# Patient Record
Sex: MALE | Race: WHITE | NOT HISPANIC OR LATINO | Employment: OTHER | URBAN - METROPOLITAN AREA
[De-identification: names, ages, dates, MRNs, and addresses within clinical notes are randomized per-mention and may not be internally consistent; named-entity substitution may affect disease eponyms.]

---

## 2017-10-16 ENCOUNTER — OFFICE VISIT (OUTPATIENT)
Dept: FAMILY MEDICINE | Facility: CLINIC | Age: 82
End: 2017-10-16
Payer: MEDICARE

## 2017-10-16 VITALS
BODY MASS INDEX: 19.18 KG/M2 | DIASTOLIC BLOOD PRESSURE: 100 MMHG | SYSTOLIC BLOOD PRESSURE: 160 MMHG | WEIGHT: 144.69 LBS | HEART RATE: 82 BPM | RESPIRATION RATE: 16 BRPM | HEIGHT: 73 IN | OXYGEN SATURATION: 96 %

## 2017-10-16 DIAGNOSIS — I10 ESSENTIAL HYPERTENSION: ICD-10-CM

## 2017-10-16 DIAGNOSIS — E78.5 DYSLIPIDEMIA: ICD-10-CM

## 2017-10-16 DIAGNOSIS — G30.0 EARLY ONSET ALZHEIMER'S DEMENTIA WITHOUT BEHAVIORAL DISTURBANCE: ICD-10-CM

## 2017-10-16 DIAGNOSIS — F02.80 EARLY ONSET ALZHEIMER'S DEMENTIA WITHOUT BEHAVIORAL DISTURBANCE: ICD-10-CM

## 2017-10-16 DIAGNOSIS — Q60.5 CONGENITAL SMALL KIDNEY: ICD-10-CM

## 2017-10-16 DIAGNOSIS — R53.83 FATIGUE, UNSPECIFIED TYPE: ICD-10-CM

## 2017-10-16 DIAGNOSIS — J43.1 PANLOBULAR EMPHYSEMA: Primary | ICD-10-CM

## 2017-10-16 PROBLEM — J44.9 COPD (CHRONIC OBSTRUCTIVE PULMONARY DISEASE): Status: ACTIVE | Noted: 2017-10-16

## 2017-10-16 PROBLEM — H40.9 GLAUCOMA: Status: ACTIVE | Noted: 2017-10-16

## 2017-10-16 PROBLEM — R03.0 SINGLE EPISODE OF ELEVATED BLOOD PRESSURE: Status: ACTIVE | Noted: 2017-10-16

## 2017-10-16 PROCEDURE — G0009 ADMIN PNEUMOCOCCAL VACCINE: HCPCS | Mod: ,,, | Performed by: INTERNAL MEDICINE

## 2017-10-16 PROCEDURE — 90732 PPSV23 VACC 2 YRS+ SUBQ/IM: CPT | Mod: ,,, | Performed by: INTERNAL MEDICINE

## 2017-10-16 PROCEDURE — 99205 OFFICE O/P NEW HI 60 MIN: CPT | Mod: ,,, | Performed by: INTERNAL MEDICINE

## 2017-10-16 RX ORDER — LOSARTAN POTASSIUM 25 MG/1
25 TABLET ORAL DAILY
Qty: 90 TABLET | Refills: 3 | Status: SHIPPED | OUTPATIENT
Start: 2017-10-16 | End: 2017-11-16

## 2017-10-16 RX ORDER — QUETIAPINE FUMARATE 25 MG/1
125 TABLET, FILM COATED ORAL NIGHTLY
COMMUNITY
Start: 2017-10-10

## 2017-10-16 RX ORDER — CITALOPRAM 10 MG/1
10 TABLET ORAL DAILY
Qty: 30 TABLET | Refills: 11 | Status: SHIPPED | OUTPATIENT
Start: 2017-10-16 | End: 2018-09-09 | Stop reason: SDUPTHER

## 2017-10-16 RX ORDER — UMECLIDINIUM 62.5 UG/1
AEROSOL, POWDER ORAL
COMMUNITY
Start: 2017-07-14 | End: 2018-11-15

## 2017-10-16 RX ORDER — ALBUTEROL SULFATE 90 UG/1
AEROSOL, METERED RESPIRATORY (INHALATION)
COMMUNITY
Start: 2017-07-10 | End: 2019-08-06

## 2017-10-16 RX ORDER — LATANOPROST 50 UG/ML
SOLUTION/ DROPS OPHTHALMIC
COMMUNITY
Start: 2017-09-20

## 2017-10-16 RX ORDER — BUDESONIDE AND FORMOTEROL FUMARATE DIHYDRATE 160; 4.5 UG/1; UG/1
AEROSOL RESPIRATORY (INHALATION)
COMMUNITY
Start: 2017-10-13 | End: 2018-11-15

## 2017-10-16 NOTE — PATIENT INSTRUCTIONS
"  Discharge Instructions: Taking Blood Pressure Medications  You have been diagnosed with high blood pressure (hypertension). Diet and exercise help control high blood pressure. Many people also need the help of one or more medicines. Here are the main types of blood pressure medicines and how they work.  Diuretics  Diuretics are sometimes called "water pills" because they work in the kidney and flush excess water and sodium (salt) from the body. These are one of the most common and  important medicines for the management of blood pressure.  Beta-blockers  Beta-blockers reduce nerve impulses to the heart and blood vessels. This makes the heart beat slower and with less force. Your blood pressure drops, and your heart does not have to work as hard, which may improve pumping function.  ACE inhibitors  ACE inhibitors cause the vessels to relax. This helps the blood flow better and lowers blood pressure.  Angiotensin antagonists  Angiotensin antagonists shield blood vessels from a hormone that causes the blood vessels to get stiff and narrow. Your vessels become wider, and your blood pressure goes down.  Calcium channel blockers (CCBs)  CCBs keep calcium from entering the muscle cells of the heart and blood vessels. This causes blood vessels to relax, and your blood pressure goes down.  Alpha-blockers  Alpha-blockers reduce nerve impulses to blood vessels. This allows blood to pass easily, causing blood pressure to go down.  Alpha-beta blockers  Alpha-beta blockers work the same way as alpha-blockers but also slow your heartbeat. As a result, less blood is pumped through your blood vessels and your blood pressure goes down.  Vasodilators  Vasodilators directly open blood vessels by relaxing the muscle in the vessel walls, causing blood pressure to go down.  Date Last Reviewed: 4/27/2016  © 4740-3938 [employer]. [address] [place] [address], [place] [address]. All rights reserved. This information is not " intended as a substitute for professional medical care. Always follow your healthcare professional's instructions.

## 2017-11-16 ENCOUNTER — OFFICE VISIT (OUTPATIENT)
Dept: FAMILY MEDICINE | Facility: CLINIC | Age: 82
End: 2017-11-16
Payer: MEDICARE

## 2017-11-16 VITALS
OXYGEN SATURATION: 98 % | RESPIRATION RATE: 16 BRPM | HEART RATE: 78 BPM | WEIGHT: 149.19 LBS | SYSTOLIC BLOOD PRESSURE: 150 MMHG | DIASTOLIC BLOOD PRESSURE: 88 MMHG | BODY MASS INDEX: 19.77 KG/M2 | HEIGHT: 73 IN

## 2017-11-16 DIAGNOSIS — I10 ESSENTIAL HYPERTENSION: Primary | ICD-10-CM

## 2017-11-16 DIAGNOSIS — G30.0 EARLY ONSET ALZHEIMER'S DEMENTIA WITHOUT BEHAVIORAL DISTURBANCE: ICD-10-CM

## 2017-11-16 DIAGNOSIS — Q60.5 CONGENITAL SMALL KIDNEY: ICD-10-CM

## 2017-11-16 DIAGNOSIS — F02.80 EARLY ONSET ALZHEIMER'S DEMENTIA WITHOUT BEHAVIORAL DISTURBANCE: ICD-10-CM

## 2017-11-16 DIAGNOSIS — E78.5 DYSLIPIDEMIA: ICD-10-CM

## 2017-11-16 DIAGNOSIS — J43.1 PANLOBULAR EMPHYSEMA: ICD-10-CM

## 2017-11-16 PROBLEM — R03.0 SINGLE EPISODE OF ELEVATED BLOOD PRESSURE: Status: RESOLVED | Noted: 2017-10-16 | Resolved: 2017-11-16

## 2017-11-16 PROCEDURE — 99214 OFFICE O/P EST MOD 30 MIN: CPT | Mod: ,,, | Performed by: INTERNAL MEDICINE

## 2017-11-16 RX ORDER — LOSARTAN POTASSIUM 50 MG/1
50 TABLET ORAL DAILY
Qty: 90 TABLET | Refills: 3 | Status: SHIPPED | OUTPATIENT
Start: 2017-11-16 | End: 2018-11-04 | Stop reason: SDUPTHER

## 2017-11-16 NOTE — PATIENT INSTRUCTIONS
Angiotensin Receptor Blockers (ARBs)  Angiotensin receptor blockers (ARBs) are medicines. They are most often prescribed to treat high blood pressure, but can be used to treat other conditions. This sheet tells you how ARBs work and how to use them effectively.  How ARBs work     Your pharmacist can help answer any questions about your ARB medications.   ARBS help reduce blood pressure by blocking a hormone (angiotensin II) produced in the kidneys. Angiotensin II raises blood pressure by constricting arteries and causing the release of another hormone (aldosterone) that retains salt, leading to further blood pressure increase. So, when an ARB blocks angiotensin II, it results in lower blood pressure by dilating arteries and decreasing blood volume by loss of salt.  What conditions ARBs treat  · Blood pressure. Because ARBs help reduce blood pressure, they are most often used to treat high blood pressure (hypertension). They may be prescribed as an alternative to angiotensin-converting enzyme (ACE) inhibitors if certain side effects are developed on ACE inhibitors, such as cough.  · Heart failure. This is when the heart is no longer able to pump enough blood throughout the body. ARBs prevent a rise in blood pressure and lessen strain on the heart. These things help treat heart failure by making it easier for the heart to pump, and improving blood flow.  · Diabetes. This is when the body does not make enough insulin to use the sugar in the blood for energy. Diabetes can damage the blood vessels. This can lead to kidney failure (when the kidneys stop working properly). High blood pressure can also damage the blood vessels. Because ARBs help to lessen blood pressure, they help decrease the risk of blood vessel damage and kidney failure.  Side effects of ARBs  Side effects may occur during the first few days of usage, some of which fade as your body gets used to the medicine. If these side effects persist or worsen,  call your healthcare provider. Some side effects may require stopping the medicine right away, as directed by your healthcare provider. Side effects can include:  · Cough  · Low blood pressure  · Dizziness  · Drowsiness  · Diarrhea  · Stuffy nose  · Raised potassium levels  · Swelling in the deep skin layers (angiodema)  Drug interactions  Some medicines affect how other drugs work when taken together. ARBs have few interactions with other drugs. But talk to your healthcare provider if you take any of the following:  · Potassium supplements, salt substitutes, and drugs that increase potassium levels  · Diuretics (water pills)  · Lithium  · Cimetidine  · Rifampin  · Fluconazole or ketoconazole  What to tell your doctor before taking ARBs  Your doctor needs to know your health history to safely prescribe medicine for you. Be sure to tell your doctor:  · What medicines you are taking, including over-the-counter types and supplements.  · If you are allergic to any medicines, especially ACE inhibitors.  · If you have or have had other medical problems such as diabetes or heart, kidney, or liver disease.  · If you are pregnant, planning to become pregnant or are breastfeeding.  Note: Do not take ARBs when youre pregnant or thinking about becoming pregnant. This medicine can harm an unborn baby.  Tips for taking medicines  ARBs need to be taken every day--even when you feel fine. Use the tips below to stay on track.  · Have a routine. Take your medication at the same time each day. This might be with breakfast, when you brush your teeth, or before you walk the dog. If you miss a pill, dont take two the next time.  · Plan ahead. Refill prescriptions before they run out. Be sure to take enough medication with you when you travel.  · Never change your dosage or stop taking medicine on your own. This can be dangerous. Always talk to your doctor before making any changes in your medication plan.  · Use reminders. Keep  medication where you can see it. Put notes on the refrigerator or other places youll see them. Using a pillbox can also help.  · Tell your healthcare provider about other medicines or supplements you take. These can react with your medicine. Some cold and flu medicines can also raise blood pressure.  When should I call my healthcare provider  Call your healthcare provider right away if you have any of the following while taking ARBs:  · Abdominal pain  · Persistent sore throat  · Excessive fatigue  · Joint or muscle aches  · Muscle weakness  · A major change in the amount of urine produced  · Signs of allergic reaction (rash, itching, swelling, trouble breathing)   Date Last Reviewed: 6/1/2016  © 0782-0293 [employer]. [address] [place] [address], [place] [address]. All rights reserved. This information is not intended as a substitute for professional medical care. Always follow your healthcare professional's instructions.

## 2017-11-18 NOTE — PROGRESS NOTES
Subjective:       Patient ID: John Knight is a 84 y.o. male.    Chief Complaint: Follow-up (1 month f/u); COPD; Glaucoma; and Emphysema    84 year old gentleman here for follow up on his new onset hypertension. 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 He does not know the status of his cholesterol. On last ov we started cozaar at 25 mg and celexa at 10 mg which was added to his seroquel. He is doing well with this addition according to his wife. They did not get the blood work done due to a misunderstanding.       Hypertension   This is a new problem. The current episode started 1 to 4 weeks ago. The problem has been gradually improving since onset. Pertinent negatives include no chest pain, headaches, neck pain, palpitations or shortness of breath. Risk factors for coronary artery disease include dyslipidemia and male gender. Past treatments include angiotensin blockers. There are no compliance problems.  Identifiable causes of hypertension include chronic renal disease.     Review of Systems   Constitutional: Negative for activity change, diaphoresis, fatigue and fever.   HENT: Negative for congestion, ear pain, postnasal drip, sinus pressure, sore throat and trouble swallowing.    Eyes: Negative for pain.   Respiratory: Negative for cough, chest tightness, shortness of breath and wheezing.    Cardiovascular: Negative for chest pain, palpitations and leg swelling.   Gastrointestinal: Negative for abdominal distention, abdominal pain, blood in stool, constipation and diarrhea.   Endocrine: Negative for cold intolerance and heat intolerance.   Genitourinary: Negative for decreased urine volume, difficulty urinating, dysuria, flank pain, frequency and hematuria.   Musculoskeletal: Negative for arthralgias, back pain, joint swelling, myalgias and neck pain.   Skin: Negative for pallor, rash and wound.   Neurological: Negative for tremors,  syncope, weakness, light-headedness, numbness and headaches.   Hematological: Negative for adenopathy.   Psychiatric/Behavioral: Positive for decreased concentration. Negative for confusion, hallucinations, self-injury, sleep disturbance and suicidal ideas. The patient is not nervous/anxious.        Past Medical History:   Diagnosis Date    Arthritis     COPD (chronic obstructive pulmonary disease)     Emphysema of lung     Glaucoma        Past Surgical History:   Procedure Laterality Date    BRAIN SURGERY      COLON SURGERY      COLONOSCOPY      HERNIA REPAIR      TONSILLECTOMY         Family History   Problem Relation Age of Onset    Arthritis Maternal Grandmother        Social History     Social History    Marital status:      Spouse name: N/A    Number of children: N/A    Years of education: N/A     Social History Main Topics    Smoking status: Former Smoker    Smokeless tobacco: Never Used    Alcohol use 0.6 oz/week     1 Cans of beer per week      Comment: occasionally; no longer dringking beer since on Seroquel    Drug use: No    Sexual activity: Not Currently     Other Topics Concern    None     Social History Narrative    None       Current Outpatient Prescriptions   Medication Sig Dispense Refill    citalopram (CELEXA) 10 MG tablet Take 1 tablet (10 mg total) by mouth once daily. 30 tablet 11    FLUZONE HIGH-DOSE 2017-18, PF, 180 mcg/0.5 mL vaccine TO BE ADMINISTERED BY PHARMACIST FOR IMMUNIZATION  0    INCRUSE ELLIPTA 62.5 mcg/actuation DsDv       latanoprost 0.005 % ophthalmic solution       quetiapine (SEROQUEL) 25 MG Tab Take 125 mg by mouth every evening.       SYMBICORT 160-4.5 mcg/actuation HFAA       VENTOLIN HFA 90 mcg/actuation inhaler       losartan (COZAAR) 50 MG tablet Take 1 tablet (50 mg total) by mouth once daily. 90 tablet 3     No current facility-administered medications for this visit.        Review of patient's allergies indicates:  No Known  Allergies  Objective:      Physical Exam   Constitutional: He is oriented to person, place, and time. He appears well-developed and well-nourished. No distress.   HENT:   Head: Normocephalic and atraumatic.   Right Ear: External ear normal.   Left Ear: External ear normal.   Nose: Nose normal.   Mouth/Throat: Oropharynx is clear and moist.   Eyes: Conjunctivae and EOM are normal. Right eye exhibits no discharge. Left eye exhibits no discharge. No scleral icterus.   Neck: Normal range of motion. Neck supple. No JVD present. No tracheal deviation present. No thyromegaly present.   Cardiovascular: Normal rate, regular rhythm, normal heart sounds and intact distal pulses.  Exam reveals no gallop.    No murmur heard.  Pulmonary/Chest: Effort normal and breath sounds normal. No respiratory distress. He has no wheezes. He has no rales.   Abdominal: Soft. Bowel sounds are normal. He exhibits no distension and no mass. There is no tenderness.   Musculoskeletal: Normal range of motion. He exhibits no edema or tenderness.   Lymphadenopathy:     He has no cervical adenopathy.   Neurological: He is alert and oriented to person, place, and time.   Skin: Skin is warm and dry. No rash noted. He is not diaphoretic. No erythema.   Psychiatric: He has a normal mood and affect. His behavior is normal. Judgment and thought content normal.         Assessment:       1. Essential hypertension    2. Dyslipidemia    3. 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 Panlobular emphysema    5. Congenital small kidney        Plan:       Essential hypertension-increase ARB   -     losartan (COZAAR) 50 MG tablet; Take 1 tablet (50 mg total) by mouth once daily.  Dispense: 90 tablet; Refill: 3    Dyslipidemia-labs to be done this month    Early onset Alzheimer's dementia without behavioral disturbance- stable , follows with neurology    Panlobular emphysema-stable on triple mainteince inhalers    Congenital small  kidney-noted last BUN/cr - 18/1.5

## 2018-01-03 LAB
ALBUMIN SERPL-MCNC: 3.8 G/DL (ref 3.1–4.7)
ALP SERPL-CCNC: 70 IU/L (ref 40–104)
ALT (SGPT): 12 IU/L (ref 3–33)
AST SERPL-CCNC: 19 IU/L (ref 10–40)
BASOPHILS NFR BLD: 0.1 K/UL (ref 0–0.2)
BASOPHILS NFR BLD: 0.8 %
BILIRUB SERPL-MCNC: 0.8 MG/DL (ref 0.3–1)
BUN SERPL-MCNC: 23 MG/DL (ref 8–20)
CALCIUM SERPL-MCNC: 9.2 MG/DL (ref 7.7–10.4)
CHLORIDE: 105 MMOL/L (ref 98–110)
CO2 SERPL-SCNC: 29.3 MMOL/L (ref 22.8–31.6)
CREATININE: 1.63 MG/DL (ref 0.6–1.4)
EOSINOPHIL NFR BLD: 0.2 K/UL (ref 0–0.7)
EOSINOPHIL NFR BLD: 2.7 %
ERYTHROCYTE [DISTWIDTH] IN BLOOD BY AUTOMATED COUNT: 14.6 % (ref 11.7–14.9)
GLUCOSE: 104 MG/DL (ref 70–99)
GRAN #: 6.1 K/UL (ref 1.4–6.5)
GRAN%: 69.1 %
HCT VFR BLD AUTO: 40.7 % (ref 39–55)
HGB BLD-MCNC: 13.5 G/DL (ref 14–16)
IMMATURE GRANS (ABS): 0 K/UL (ref 0–1)
IMMATURE GRANULOCYTES: 0.5 %
LYMPH #: 1.6 K/UL (ref 1.2–3.4)
LYMPH%: 17.5 %
MCH RBC QN AUTO: 31.1 PG (ref 25–35)
MCHC RBC AUTO-ENTMCNC: 33.2 G/DL (ref 31–36)
MCV RBC AUTO: 93.8 FL (ref 80–100)
MONO #: 0.8 K/UL (ref 0.1–0.6)
MONO%: 9.4 %
NUCLEATED RBCS: 0 %
PLATELET # BLD AUTO: 244 K/UL (ref 140–440)
PMV BLD AUTO: 9.1 FL (ref 8.8–12.7)
POTASSIUM SERPL-SCNC: 4.4 MMOL/L (ref 3.5–5)
PROT SERPL-MCNC: 7.1 G/DL (ref 6–8.2)
RBC # BLD AUTO: 4.34 M/UL (ref 4.3–5.9)
SODIUM: 139 MMOL/L (ref 134–144)
TSH SERPL DL<=0.005 MIU/L-ACNC: 2.71 ULU/ML (ref 0.3–5.6)
WBC # BLD AUTO: 8.8 K/UL (ref 5–10)

## 2018-01-04 LAB — HOMOCYSTEINE: 13.5 UMOL/L (ref 0–15)

## 2018-01-16 ENCOUNTER — OFFICE VISIT (OUTPATIENT)
Dept: FAMILY MEDICINE | Facility: CLINIC | Age: 83
End: 2018-01-16
Payer: MEDICARE

## 2018-01-16 VITALS
DIASTOLIC BLOOD PRESSURE: 78 MMHG | BODY MASS INDEX: 19.49 KG/M2 | HEART RATE: 66 BPM | SYSTOLIC BLOOD PRESSURE: 139 MMHG | RESPIRATION RATE: 16 BRPM | HEIGHT: 72 IN | WEIGHT: 143.88 LBS | OXYGEN SATURATION: 92 %

## 2018-01-16 DIAGNOSIS — I10 ESSENTIAL HYPERTENSION: ICD-10-CM

## 2018-01-16 DIAGNOSIS — N18.30 CKD (CHRONIC KIDNEY DISEASE) STAGE 3, GFR 30-59 ML/MIN: ICD-10-CM

## 2018-01-16 DIAGNOSIS — E78.5 DYSLIPIDEMIA: ICD-10-CM

## 2018-01-16 DIAGNOSIS — E55.9 VITAMIN D DEFICIENCY: ICD-10-CM

## 2018-01-16 DIAGNOSIS — G30.0 EARLY ONSET ALZHEIMER'S DEMENTIA WITHOUT BEHAVIORAL DISTURBANCE: Primary | ICD-10-CM

## 2018-01-16 DIAGNOSIS — F02.80 EARLY ONSET ALZHEIMER'S DEMENTIA WITHOUT BEHAVIORAL DISTURBANCE: Primary | ICD-10-CM

## 2018-01-16 PROCEDURE — 99214 OFFICE O/P EST MOD 30 MIN: CPT | Mod: ,,, | Performed by: INTERNAL MEDICINE

## 2018-01-16 RX ORDER — ATORVASTATIN CALCIUM 20 MG/1
20 TABLET, FILM COATED ORAL DAILY
Qty: 90 TABLET | Refills: 3 | Status: SHIPPED | OUTPATIENT
Start: 2018-01-16 | End: 2019-01-06 | Stop reason: SDUPTHER

## 2018-01-16 NOTE — PATIENT INSTRUCTIONS

## 2018-01-17 NOTE — PROGRESS NOTES
Subjective:       Patient ID: John Knight is a 84 y.o. male.    Chief Complaint: Follow-up; Hypertension; and Labs Only    84-year-old male here for follow-up after blood work to evaluate his chronic kidney disease stage II from a congenital small kidney as well as mild anemia and Alzheimer's disease which almost appears to be more vascular-like dementia. Appointment with his neurologist next week and the patient is now up to 200 mg a seroquel evening with good control of not only sleep but also aggressive behavior. We did discuss the fact that he has not been on medications traditionally used for Alzheimer's like Aricept or Namenda and the wife says that at one time he was but it did not do a sore  To her that traditionally did not make the memory better prevent decline from worsening. His cholesterol was up and his LDL was 170 and so with the discussion of vascular dementia possibly combination with L Rodrigues dementia we will start a statin. I also recommended he go on a baby dose aspirin. Patient's BMI is still underweight      Review of Systems   Constitutional: Negative for activity change, diaphoresis, fatigue and fever.   HENT: Negative for congestion, ear pain, postnasal drip, sinus pressure, sore throat and trouble swallowing.    Eyes: Negative for pain.   Respiratory: Negative for cough, chest tightness, shortness of breath and wheezing.    Cardiovascular: Negative for chest pain, palpitations and leg swelling.   Gastrointestinal: Negative for abdominal distention, abdominal pain, blood in stool, constipation and diarrhea.   Endocrine: Negative for cold intolerance and heat intolerance.   Genitourinary: Negative for decreased urine volume, difficulty urinating, dysuria, flank pain, frequency and hematuria.   Musculoskeletal: Negative for arthralgias, back pain, joint swelling, myalgias and neck pain.   Skin: Negative for pallor, rash and wound.   Neurological: Negative for tremors, syncope, weakness,  light-headedness, numbness and headaches.   Hematological: Negative for adenopathy.   Psychiatric/Behavioral: Negative for confusion, decreased concentration, hallucinations, self-injury, sleep disturbance and suicidal ideas. The patient is not nervous/anxious.        Past Medical History:   Diagnosis Date    Arthritis     COPD (chronic obstructive pulmonary disease)     Emphysema of lung     Glaucoma        Past Surgical History:   Procedure Laterality Date    BRAIN SURGERY      COLON SURGERY      COLONOSCOPY      HERNIA REPAIR      TONSILLECTOMY         Family History   Problem Relation Age of Onset    Arthritis Maternal Grandmother        Social History     Social History    Marital status:      Spouse name: N/A    Number of children: N/A    Years of education: N/A     Social History Main Topics    Smoking status: Former Smoker    Smokeless tobacco: Never Used    Alcohol use 0.6 oz/week     1 Cans of beer per week      Comment: occasionally; no longer dringking beer since on Seroquel    Drug use: No    Sexual activity: Not Currently     Other Topics Concern    None     Social History Narrative    None       Current Outpatient Prescriptions   Medication Sig Dispense Refill    atorvastatin (LIPITOR) 20 MG tablet Take 1 tablet (20 mg total) by mouth once daily. 90 tablet 3    citalopram (CELEXA) 10 MG tablet Take 1 tablet (10 mg total) by mouth once daily. 30 tablet 11    FLUZONE HIGH-DOSE 2017-18, PF, 180 mcg/0.5 mL vaccine TO BE ADMINISTERED BY PHARMACIST FOR IMMUNIZATION  0    INCRUSE ELLIPTA 62.5 mcg/actuation DsDv       latanoprost 0.005 % ophthalmic solution       losartan (COZAAR) 50 MG tablet Take 1 tablet (50 mg total) by mouth once daily. 90 tablet 3    quetiapine (SEROQUEL) 25 MG Tab Take 125 mg by mouth every evening.       SYMBICORT 160-4.5 mcg/actuation HFAA       VENTOLIN HFA 90 mcg/actuation inhaler       zoster vaccine live, PF, (ZOSTAVAX, PF,) 19,400 unit/0.65  mL injection Inject 19,400 Units into the skin once. 1 vial 0     No current facility-administered medications for this visit.        Review of patient's allergies indicates:  No Known Allergies  Objective:      Physical Exam   Constitutional: He is oriented to person, place, and time. He appears well-developed and well-nourished. No distress.   HENT:   Head: Normocephalic and atraumatic.   Right Ear: External ear normal.   Left Ear: External ear normal.   Nose: Nose normal.   Mouth/Throat: Oropharynx is clear and moist.   Eyes: Conjunctivae and EOM are normal. Right eye exhibits no discharge. Left eye exhibits no discharge. No scleral icterus.   Neck: Normal range of motion. Neck supple. No JVD present. No tracheal deviation present. No thyromegaly present.   Cardiovascular: Normal rate, regular rhythm, normal heart sounds and intact distal pulses.  Exam reveals no gallop.    No murmur heard.  Pulmonary/Chest: Effort normal and breath sounds normal. No respiratory distress. He has no wheezes. He has no rales.   Abdominal: Soft. Bowel sounds are normal. He exhibits no distension and no mass. There is no tenderness.   Musculoskeletal: Normal range of motion. He exhibits no edema or tenderness.   Lymphadenopathy:     He has no cervical adenopathy.   Neurological: He is alert and oriented to person, place, and time.   Skin: Skin is warm and dry. No rash noted. He is not diaphoretic. No erythema.   Psychiatric: He has a normal mood and affect. His behavior is normal. Judgment and thought content normal.       Lab Results   Component Value Date    WBC 8.8 01/03/2018    HGB 13.5 (L) 01/03/2018    HCT 40.7 01/03/2018     01/03/2018    CHOL 167 05/13/2010    TRIG 100 05/13/2010    HDL 58 05/13/2010    ALT 13 05/13/2010    AST 19 01/03/2018     01/03/2018    K 4.4 01/03/2018     01/03/2018    CREATININE 1.63 (H) 01/03/2018    BUN 23 (H) 01/03/2018    CO2 29.3 01/03/2018    TSH 2.71 01/03/2018      Assessment:       1. 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 Dyslipidemia    3. Essential hypertension    4. CKD (chronic kidney disease) stage 3, GFR 30-59 ml/min    5. 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  Dispense: 90 tablet; Refill: 3  -     Lipid panel; Future; Expected date: 01/16/2018  -     Comprehensive metabolic panel; Future; Expected date: 01/16/2018    Essential hypertension-Much better control with an increase of the ARB, Cozaar to 50 mg which is an entirely new medication for him over the past few months and unfortunately there is a mild decline in his kidney function but will leave that to his nephrologist    CKD (chronic kidney disease) stage 3, GFR 30-59 ml/min  -     Comprehensive metabolic panel; Future; Expected date: 01/16/2018  -     Microalbumin/creatinine urine ratio; Future; Expected date: 01/16/2018    Vitamin D deficiency  -     Vitamin D; Future; Expected date: 01/17/2018    Other orders  -     zoster vaccine live, PF, (ZOSTAVAX, PF,) 19,400 unit/0.65 mL injection; Inject 19,400 Units into the skin once.  Dispense: 1 vial; Refill: 0    Time spent with the patient was 40 min and 50 percent of that was in face to face contact

## 2018-01-18 ENCOUNTER — TELEPHONE (OUTPATIENT)
Dept: FAMILY MEDICINE | Facility: CLINIC | Age: 83
End: 2018-01-18

## 2018-01-18 NOTE — TELEPHONE ENCOUNTER
----- Message from Idania Connors MD sent at 1/8/2018  6:13 AM CST -----  Keep ov next week to discuss lab work abnormalities - chol, urine, kidney etc

## 2018-05-14 LAB
ALBUMIN SERPL-MCNC: 3.5 G/DL (ref 3.1–4.7)
ALP SERPL-CCNC: 82 IU/L (ref 40–104)
ALT (SGPT): 11 IU/L (ref 3–33)
AST SERPL-CCNC: 19 IU/L (ref 10–40)
BILIRUB SERPL-MCNC: 0.3 MG/DL (ref 0.3–1)
BUN SERPL-MCNC: 21 MG/DL (ref 8–20)
CALCIUM SERPL-MCNC: 8.9 MG/DL (ref 7.7–10.4)
CHLORIDE: 109 MMOL/L (ref 98–110)
CO2 SERPL-SCNC: 28.5 MMOL/L (ref 22.8–31.6)
CREATININE RANDOM URINE: 77 MG/DL
CREATININE: 1.43 MG/DL (ref 0.6–1.4)
GLUCOSE: 94 MG/DL (ref 70–99)
MICROALBUM.,U,RANDOM: 43.1 MCG/ML (ref 0–19.9)
MICROALBUMIN/CREATININE RATIO: 56 (ref 0–30)
POTASSIUM SERPL-SCNC: 4.3 MMOL/L (ref 3.5–5)
PROT SERPL-MCNC: 6.9 G/DL (ref 6–8.2)
SODIUM: 143 MMOL/L (ref 134–144)
VITAMIN D, 1,25 (OH)2: 41.5 NG/ML (ref 30–100)

## 2018-05-15 ENCOUNTER — OFFICE VISIT (OUTPATIENT)
Dept: FAMILY MEDICINE | Facility: CLINIC | Age: 83
End: 2018-05-15
Payer: MEDICARE

## 2018-05-15 VITALS
RESPIRATION RATE: 16 BRPM | BODY MASS INDEX: 18.78 KG/M2 | SYSTOLIC BLOOD PRESSURE: 130 MMHG | DIASTOLIC BLOOD PRESSURE: 75 MMHG | HEART RATE: 66 BPM | WEIGHT: 138.63 LBS | OXYGEN SATURATION: 98 % | HEIGHT: 72 IN

## 2018-05-15 DIAGNOSIS — R63.6 UNDERWEIGHT: ICD-10-CM

## 2018-05-15 DIAGNOSIS — E78.5 DYSLIPIDEMIA: ICD-10-CM

## 2018-05-15 DIAGNOSIS — Q60.5 CONGENITAL SMALL KIDNEY: ICD-10-CM

## 2018-05-15 DIAGNOSIS — I10 ESSENTIAL HYPERTENSION: Primary | ICD-10-CM

## 2018-05-15 DIAGNOSIS — N18.30 CKD (CHRONIC KIDNEY DISEASE) STAGE 3, GFR 30-59 ML/MIN: ICD-10-CM

## 2018-05-15 PROCEDURE — 99214 OFFICE O/P EST MOD 30 MIN: CPT | Mod: ,,, | Performed by: INTERNAL MEDICINE

## 2018-05-15 NOTE — PATIENT INSTRUCTIONS
For Caregivers: Improving Communication with Dementia Patients  Dementia makes it harder for your loved one to understand and be understood. This can be troubling for both of you. Remember, these problems are not your loved ones fault. Theyre due to the disease. These tips can help you find ways to cope.    Keep it simple  The best way to talk to your loved one is using simple words and short sentences. Be sure you have his or her attention. Stick to one subject at a time. And use a gentle, positive tone of voice. Timing is also important. Giving information for activities that are hours away may be a waste of time and energy.  Instead of saying: Its cold outside, so be sure to put on your coat and hat.  Try: Please put on your coat. Now put on your hat.   Be patient  People with dementia often lose their short-term memory. This means they may ask the same question over and over. Although it can be frustrating, do your best to remain calm. Try changing the subject or getting your loved one to focus on a new task. Or, try to understand why the question is being asked. For example, your loved one may worry about missing an appointment or being left behind.  Instead of saying: Sharmaine already told you--the appointment is at 2 oclock!  Try: Dont worry. Im going with you.   Use distraction  Your loved one may try to do things that are inappropriate or unsafe. At these times, a good tactic is using distraction. Try getting your loved one to focus on something new. Your loved one may then forget what he or she was doing in the first place.  Instead of saying: What are you doing? You cant go out now!  Try: Would you help me with dinner?   Try statements, not questions  Your loved one may not want to do certain activities. Instead of arguing, phrase requests as statements rather than questions. Using visual cues, such as holding a towel when its time for a bath, can also help.  Instead of saying: Do you  want to take your bath now?  Try: Its time for your bath. Let me help you get ready.   Avoid arguing about reality  People with dementia may not be able to separate past from present. If this happens, dont insist on your version of reality. It may be best to change the subject or play along to avoid added stress. Refrain from using harsh words or angry gestures. Your loved one may not understand you. But he or she can still be upset by your emotional cues. And if your loved one becomes very upset, stay calm. Try to redirect his or her attention to another activity.  Instead of saying: You cant call your father. Hes been dead for years!  Try: Lets look at some pictures of him.   Coping with changes in behavior and personality  People with dementia may have moments when they seem perfectly normal. At other times, they may act suspicious, angry, or afraid. They can also become agitated, or see things that arent there. If this happens, try to be understanding. For them, the world can be a very stressful place. However, if these changes are sudden, severe, or create safety issues, talk to a doctor. You should also mention any signs of depression. These include withdrawal, loss of interest in activities or food, extreme tiredness, and crying.  Date Last Reviewed: 7/1/2016  © 5285-2983 [employer]. [address] [place] [address], [place] [address]. All rights reserved. This information is not intended as a substitute for professional medical care. Always follow your healthcare professional's instructions.

## 2018-05-15 NOTE — PROGRESS NOTES
Subjective:       Patient ID: John Knight is a 84 y.o. male.    Chief Complaint: Follow-up (3 month f/u) and Hyperlipidemia    84-year-old gentleman who is here for follow-up after lab work and for his chronic medical illnesses most importantly to include dyslipidemia very recently started him on atorvastatin due to his worsening dementia. He also has a history of hypertension and COPD which is been very stable for quite some time. The patient is seen neurology and now is up to 100 mg of her call in the evening. His dementia must be small vessel disease as he is not on Aricept or Namenda but he is on Celexa 10 mg. His blood pressure stable and he denies any symptoms of headaches or shortness of breath. His lab work was reviewed and all was within normal limits except his BUN/creatinine which revealed stage III chronic kidney disease which was found 6 months prior. He has seen the kidney doctor one time and his microalbumin to creatinine ratio slightly elevated as well. 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 His lipid profiles under excellent control on the atorvastatin. It has been very difficult for the patient to keep on weight as he has no appetite. At present his BMI is 18.8. He has lost 5 pounds since his last office visit 4 months ago.       Review of Systems   Constitutional: Positive for unexpected weight change. Negative for activity change, diaphoresis, fatigue and fever.   HENT: Negative for congestion, ear pain, postnasal drip, sinus pressure, sore throat and trouble swallowing.    Eyes: Negative for pain.   Respiratory: Negative for cough, chest tightness, shortness of breath and wheezing.    Cardiovascular: Negative for chest pain, palpitations and leg swelling.   Gastrointestinal: Negative for abdominal distention, abdominal pain, blood in stool, constipation and diarrhea.   Endocrine: Negative for cold intolerance and heat intolerance.   Genitourinary:  Negative for decreased urine volume, difficulty urinating, dysuria, flank pain, frequency and hematuria.   Musculoskeletal: Negative for arthralgias, back pain, joint swelling, myalgias and neck pain.   Skin: Negative for pallor, rash and wound.   Neurological: Negative for tremors, syncope, weakness, light-headedness, numbness and headaches.   Hematological: Negative for adenopathy.   Psychiatric/Behavioral: Negative for confusion, decreased concentration, hallucinations, self-injury, sleep disturbance and suicidal ideas. The patient is not nervous/anxious.        Past Medical History:   Diagnosis Date    Arthritis     COPD (chronic obstructive pulmonary disease)     Emphysema of lung     Glaucoma        Past Surgical History:   Procedure Laterality Date    BRAIN SURGERY      COLON SURGERY      COLONOSCOPY      HERNIA REPAIR      TONSILLECTOMY         Family History   Problem Relation Age of Onset    Arthritis Maternal Grandmother        Social History     Social History    Marital status:      Spouse name: N/A    Number of children: N/A    Years of education: N/A     Social History Main Topics    Smoking status: Former Smoker    Smokeless tobacco: Never Used    Alcohol use 0.6 oz/week     1 Cans of beer per week      Comment: occasionally; no longer dringking beer since on Seroquel    Drug use: No    Sexual activity: Not Currently     Other Topics Concern    None     Social History Narrative    None       Current Outpatient Prescriptions   Medication Sig Dispense Refill    atorvastatin (LIPITOR) 20 MG tablet Take 1 tablet (20 mg total) by mouth once daily. 90 tablet 3    citalopram (CELEXA) 10 MG tablet Take 1 tablet (10 mg total) by mouth once daily. 30 tablet 11    FLUZONE HIGH-DOSE 2017-18, PF, 180 mcg/0.5 mL vaccine TO BE ADMINISTERED BY PHARMACIST FOR IMMUNIZATION  0    INCRUSE ELLIPTA 62.5 mcg/actuation DsDv       latanoprost 0.005 % ophthalmic solution       losartan  (COZAAR) 50 MG tablet Take 1 tablet (50 mg total) by mouth once daily. 90 tablet 3    quetiapine (SEROQUEL) 25 MG Tab Take 125 mg by mouth every evening.       SYMBICORT 160-4.5 mcg/actuation HFAA       VENTOLIN HFA 90 mcg/actuation inhaler       arginine-glutamine-calcium HMB (FIONA) 7-7-1.5 gram PwPk Take 1 packet by mouth 2 (two) times daily. 60 each 11     No current facility-administered medications for this visit.        Review of patient's allergies indicates:  No Known Allergies  Objective:      Physical Exam   Constitutional: He is oriented to person, place, and time. He appears well-developed and well-nourished. No distress.   HENT:   Head: Normocephalic and atraumatic.   Right Ear: External ear normal.   Left Ear: External ear normal.   Nose: Nose normal.   Mouth/Throat: Oropharynx is clear and moist.   Eyes: Conjunctivae and EOM are normal. Right eye exhibits no discharge. Left eye exhibits no discharge. No scleral icterus.   Neck: Normal range of motion. Neck supple. No JVD present. No tracheal deviation present. No thyromegaly present.   Cardiovascular: Normal rate, regular rhythm, normal heart sounds and intact distal pulses.  Exam reveals no gallop.    No murmur heard.  Pulmonary/Chest: Effort normal and breath sounds normal. No respiratory distress. He has no wheezes. He has no rales.   Abdominal: Soft. Bowel sounds are normal. He exhibits no distension and no mass. There is no tenderness.   Musculoskeletal: Normal range of motion. He exhibits no edema or tenderness.   Lymphadenopathy:     He has no cervical adenopathy.   Neurological: He is alert and oriented to person, place, and time.   Skin: Skin is warm and dry. No rash noted. He is not diaphoretic. No erythema.   Psychiatric: He has a normal mood and affect. His behavior is normal. Judgment and thought content normal.       Lab Results   Component Value Date    WBC 8.8 01/03/2018    HGB 13.5 (L) 01/03/2018    HCT 40.7 01/03/2018      01/03/2018    CHOL 167 05/13/2010    TRIG 100 05/13/2010    HDL 58 05/13/2010    ALT 13 05/13/2010    AST 19 05/14/2018     05/14/2018    K 4.3 05/14/2018     05/14/2018    CREATININE 1.43 (H) 05/14/2018    BUN 21 (H) 05/14/2018    CO2 28.5 05/14/2018    TSH 2.71 01/03/2018     Assessment:       1. Essential hypertension    2. Dyslipidemia    3. Congenital small kidney    4. CKD (chronic kidney disease) stage 3, GFR 30-59 ml/min    5. Underweight        Plan:       Essential hypertension-Now controlled on high-dose of arm    Dyslipidemia-first lipid profile after starting atorvastatin great.    Congenital small kidney  Accounting for  CKD (chronic kidney disease) stage 3, GFR 30-59 ml/min-reiterated no nonsteroidals    Underweight  -     arginine-glutamine-calcium HMB (FIONA) 7-7-1.5 gram PwPk; Take 1 packet by mouth 2 (two) times daily.  Dispense: 60 each; Refill: 11

## 2018-08-28 RX ORDER — MEGESTROL ACETATE 40 MG/ML
400 SUSPENSION ORAL DAILY
Qty: 300 ML | Refills: 11 | Status: SHIPPED | OUTPATIENT
Start: 2018-08-28 | End: 2019-05-07 | Stop reason: SDUPTHER

## 2018-09-09 DIAGNOSIS — F02.80 EARLY ONSET ALZHEIMER'S DEMENTIA WITHOUT BEHAVIORAL DISTURBANCE: ICD-10-CM

## 2018-09-09 DIAGNOSIS — G30.0 EARLY ONSET ALZHEIMER'S DEMENTIA WITHOUT BEHAVIORAL DISTURBANCE: ICD-10-CM

## 2018-09-10 RX ORDER — CITALOPRAM 10 MG/1
10 TABLET ORAL DAILY
Qty: 30 TABLET | Refills: 11 | Status: SHIPPED | OUTPATIENT
Start: 2018-09-10 | End: 2019-09-10

## 2018-10-11 DIAGNOSIS — I10 ESSENTIAL HYPERTENSION: ICD-10-CM

## 2018-10-11 RX ORDER — LOSARTAN POTASSIUM 25 MG/1
25 TABLET ORAL DAILY
Qty: 90 TABLET | Refills: 3 | Status: SHIPPED | OUTPATIENT
Start: 2018-10-11 | End: 2018-11-05

## 2018-11-04 DIAGNOSIS — I10 ESSENTIAL HYPERTENSION: ICD-10-CM

## 2018-11-05 RX ORDER — LOSARTAN POTASSIUM 50 MG/1
50 TABLET ORAL DAILY
Qty: 90 TABLET | Refills: 1 | Status: SHIPPED | OUTPATIENT
Start: 2018-11-05 | End: 2019-05-09 | Stop reason: SDUPTHER

## 2018-11-15 ENCOUNTER — OFFICE VISIT (OUTPATIENT)
Dept: FAMILY MEDICINE | Facility: CLINIC | Age: 83
End: 2018-11-15
Payer: MEDICARE

## 2018-11-15 VITALS
OXYGEN SATURATION: 96 % | HEIGHT: 72 IN | BODY MASS INDEX: 17.41 KG/M2 | RESPIRATION RATE: 16 BRPM | DIASTOLIC BLOOD PRESSURE: 68 MMHG | SYSTOLIC BLOOD PRESSURE: 112 MMHG | WEIGHT: 128.5 LBS | HEART RATE: 50 BPM

## 2018-11-15 DIAGNOSIS — N18.30 CKD (CHRONIC KIDNEY DISEASE) STAGE 3, GFR 30-59 ML/MIN: ICD-10-CM

## 2018-11-15 DIAGNOSIS — G30.0 EARLY ONSET ALZHEIMER'S DEMENTIA WITHOUT BEHAVIORAL DISTURBANCE: ICD-10-CM

## 2018-11-15 DIAGNOSIS — R63.4 ABNORMAL WEIGHT LOSS: ICD-10-CM

## 2018-11-15 DIAGNOSIS — I10 ESSENTIAL HYPERTENSION: ICD-10-CM

## 2018-11-15 DIAGNOSIS — R63.0 ANOREXIA: ICD-10-CM

## 2018-11-15 DIAGNOSIS — F02.80 EARLY ONSET ALZHEIMER'S DEMENTIA WITHOUT BEHAVIORAL DISTURBANCE: ICD-10-CM

## 2018-11-15 DIAGNOSIS — Z85.51 HISTORY OF CARCINOMA OF BLADDER: ICD-10-CM

## 2018-11-15 DIAGNOSIS — Z98.890 HISTORY OF URETEROSTOMY: ICD-10-CM

## 2018-11-15 DIAGNOSIS — N39.0 URINARY TRACT INFECTION ASSOCIATED WITH NEPHROSTOMY CATHETER, INITIAL ENCOUNTER: Primary | ICD-10-CM

## 2018-11-15 DIAGNOSIS — T83.512A URINARY TRACT INFECTION ASSOCIATED WITH NEPHROSTOMY CATHETER, INITIAL ENCOUNTER: Primary | ICD-10-CM

## 2018-11-15 PROCEDURE — 99214 OFFICE O/P EST MOD 30 MIN: CPT | Mod: ,,, | Performed by: INTERNAL MEDICINE

## 2018-11-15 RX ORDER — MIRTAZAPINE 7.5 MG/1
7.5 TABLET, FILM COATED ORAL NIGHTLY
Qty: 30 TABLET | Refills: 11 | Status: SHIPPED | OUTPATIENT
Start: 2018-11-15 | End: 2019-05-15

## 2018-11-15 RX ORDER — CIPROFLOXACIN 500 MG/1
500 TABLET ORAL 2 TIMES DAILY
Qty: 14 TABLET | Refills: 0 | Status: SHIPPED | OUTPATIENT
Start: 2018-11-15 | End: 2019-03-23 | Stop reason: SDUPTHER

## 2018-11-15 NOTE — PATIENT INSTRUCTIONS
When Your Child Has a Urinary Tract Infection (UTI)   A urinary tract infection (UTI) is a bacterial infection in the urinary tract. The urinary tract is made up of the kidneys, ureters, bladder, and urethra. Children often get UTIs that affect the bladder. UTIs can be uncomfortable and painful. But with treatment, most children recover with no lasting effects.  What is the urinary tract?  The following body parts make up the urinary tract:     A urinary tract infection is caused by bacteria that enter the urinary tract.    · Kidneys filter waste from the blood and make urine.  · Ureters carry urine from the kidneys to the bladder.  · The bladder stores urine.  · The urethra carries urine from the bladder to the outside of the body.  What causes a urinary tract infection?  Most UTIs are caused by bacteria that enter the urinary tract through the urethra. The urinary tracts of boys and girls are slightly different. The urethra is shorter in girls. This makes it easier for bacteria to enter. As a result, girls are more likely than boys to get UTIs.  What are the symptoms of a urinary tract infection?  · If your child has a UTI affecting the bladder (cystitis), symptoms can include:  ¨ Painful urination  ¨ Frequent urination  ¨ Urgent need to urinate  ¨ Blood in the urine  ¨ Daytime wetting or nighttime bedwetting when previously continent  · If your child has a UTI affecting the kidneys (pyelonephritis), symptoms are similar to those of a bladder infection. They can also include:  ¨ Fever  ¨ Abdominal pain  ¨ Nausea and vomiting  ¨ Cloudy urine  ¨ Foul-smelling urine  How is a urinary tract infection diagnosed?  · The doctor asks about your childs symptoms and health history. Your child is examined.  · A lab test, such as a urinalysis, is done. For this test, a urine sample is needed to check for bacteria and other signs of infection. The urine is also sent for a culture, a test that identifies what bacteria is  growing in the urine. It can take 1 to 3 days to get results of a urine culture. If a UTI is suspected, the doctor will likely start treatment even before lab results come back.  · If your child has severe symptoms, other tests may be done. Youll be told more about this, if needed.  How is a urinary tract infection treated?  · Symptoms of a UTI generally go away within 24 to 72 hours of starting treatment.  · The doctor will prescribe antibiotics for your child. Make sure your child takes ALL of the medication even if he or she starts feeling better.   · You can do the following at home to relieve your childs symptoms:  ¨ Give your child over-the-counter (OTC) medications, such as ibuprofen or acetaminophen, to manage pain and fever. Do not give ibuprofen to an infant who is less than 6 months of age, or to a child who is dehydrated or constantly vomiting. Do not give aspirin to a child with a fever. This can put your child at risk of a serious illness called Reyes syndrome.  ¨ Ask your doctor about other medications that can be prescribed to relieve painful urination.  ¨ Give your child plenty of fluids to drink. Cranberry juice may help relieve some pain symptoms.  When you should call your healthcare provider  Call the doctor if your child has any of the following:  · Symptoms that do not improve within 48 hours of starting treatment  · Fever (see Fever and children, below)  · A fever that goes away but returns after starting treatment  · Increased abdominal or back pain  · Signs of dehydration (very dark or little urine, excessive thirst, dry mouth, dizziness)  · Vomiting or inability to tolerate prescribed antibiotics  · Child begins acting sicker  · If a urine culture was done, make sure to get the results from the healthcare provider. Make an appointment to follow up about a week after your child has finished antibiotics.  Fever and children  Always use a digital thermometer to check your childs  temperature. Never use a mercury thermometer.  For infants and toddlers, be sure to use a rectal thermometer correctly. A rectal thermometer may accidentally poke a hole in (perforate) the rectum. It may also pass on germs from the stool. Always follow the product makers directions for proper use. If you dont feel comfortable taking a rectal temperature, use another method. When you talk to your childs healthcare provider, tell him or her which method you used to take your childs temperature.  Here are guidelines for fever temperature. Ear temperatures arent accurate before 6 months of age. Dont take an oral temperature until your child is at least 4 years old.  Infant under 3 months old:  · Ask your childs healthcare provider how you should take the temperature.  · Rectal or forehead (temporal artery) temperature of 100.4°F (38°C) or higher, or as directed by the provider  · Armpit temperature of 99°F (37.2°C) or higher, or as directed by the provider  Child age 3 to 36 months:  · Rectal, forehead (temporal artery), or ear temperature of 102°F (38.9°C) or higher, or as directed by the provider  · Armpit temperature of 101°F (38.3°C) or higher, or as directed by the provider  Child of any age:  · Repeated temperature of 104°F (40°C) or higher, or as directed by the provider  · Fever that lasts more than 24 hours in a child under 2 years old. Or a fever that lasts for 3 days in a child 2 years or older.      How is a urinary tract infection prevented?  · Encourage your child to drink plenty of fluids.  · Encourage your child to empty the bladder all the way when urinating.  · Teach girls to wipe from the front to back when using the bathroom.  · Don't use bubble bath.  · Don't allow your child to become constipated.  · If your child has a UTI, he or she may need ultrasound imaging of the kidneys and bladder. This helps the doctor rule out possible anatomical problems that could cause a UTI. If problems are  found, or if your child has recurrent UTIs, additional imaging tests may be helpful.  Date Last Reviewed: 1/1/2017  © 3537-0643 [employer]. [address] [place] [address], [place] [address]. All rights reserved. This information is not intended as a substitute for professional medical care. Always follow your healthcare professional's instructions.

## 2018-11-15 NOTE — PROGRESS NOTES
Subjective:       Patient ID: John Knight is a 85 y.o. male.    Chief Complaint: Follow-up (6 month f/u); Hypertension; Shortness of Breath; and COPD    85-year-old gentleman here for six-month follow-up for hypertension, COPD he also has chronic kidney disease. He has fairly progressive Alzheimer's disease for which over the past year his appetite has gone down significantly. He was never a big eater. He is 20 pounds lighter at least that he was the year prior. His wife is with him and has become difficult to bath, eat and sometimes dress himself. He has a urostomy tube for bladder cancer back in 2000 and had no problems with it but he has been forgetting even when reminded to put on the ostomy bag. Today he came with no back and upon confrontation in the examining room there was an odor to his self that was consistent with a urinary tract infection. He has no fevers or chills. In trying to look at the urine it was a scant amount in the ostomy site without the bad that did look a little cloudy. He's also has COPD he had a little increased shortness of breath with the weather change over the past couple of days. He is maintained now on Trelegy. He is on Seroquel at night 125 mg and Celexa 10 mg in the morning. He's been on Megace for a few months now with a subtle change in his appetite but no help with weight gain.      Review of Systems   Constitutional: Positive for appetite change, fatigue and unexpected weight change. Negative for activity change, diaphoresis and fever.   HENT: Negative for congestion, ear pain, postnasal drip, sinus pressure, sore throat and trouble swallowing.    Eyes: Negative for pain.   Respiratory: Positive for shortness of breath. Negative for cough, chest tightness and wheezing.    Cardiovascular: Negative for chest pain, palpitations and leg swelling.   Gastrointestinal: Negative for abdominal distention, abdominal pain, blood in stool, constipation and diarrhea.   Endocrine:  Negative for cold intolerance and heat intolerance.   Genitourinary: Negative for decreased urine volume, difficulty urinating, dysuria, flank pain, frequency and hematuria.   Musculoskeletal: Negative for arthralgias, back pain, joint swelling, myalgias and neck pain.   Skin: Negative for pallor, rash and wound.   Neurological: Negative for tremors, syncope, weakness, light-headedness, numbness and headaches.   Hematological: Negative for adenopathy.   Psychiatric/Behavioral: Negative for confusion, decreased concentration, hallucinations, self-injury, sleep disturbance and suicidal ideas. The patient is not nervous/anxious.        Past Medical History:   Diagnosis Date    Arthritis     COPD (chronic obstructive pulmonary disease)     Emphysema of lung     Glaucoma        Past Surgical History:   Procedure Laterality Date    BRAIN SURGERY      COLON SURGERY      COLONOSCOPY      HERNIA REPAIR      TONSILLECTOMY         Family History   Problem Relation Age of Onset    Arthritis Maternal Grandmother        Social History     Socioeconomic History    Marital status:      Spouse name: None    Number of children: None    Years of education: None    Highest education level: None   Social Needs    Financial resource strain: None    Food insecurity - worry: None    Food insecurity - inability: None    Transportation needs - medical: None    Transportation needs - non-medical: None   Occupational History    None   Tobacco Use    Smoking status: Former Smoker    Smokeless tobacco: Never Used   Substance and Sexual Activity    Alcohol use: Yes     Alcohol/week: 0.6 oz     Types: 1 Cans of beer per week     Comment: occasionally; no longer dringking beer since on Seroquel    Drug use: No    Sexual activity: Not Currently   Other Topics Concern    None   Social History Narrative    None       Current Outpatient Medications   Medication Sig Dispense Refill    arginine-glutamine-calcium HMB  (FIONA) 7-7-1.5 gram PwPk Take 1 packet by mouth 2 (two) times daily. 60 each 11    atorvastatin (LIPITOR) 20 MG tablet Take 1 tablet (20 mg total) by mouth once daily. 90 tablet 3    citalopram (CELEXA) 10 MG tablet TAKE 1 TABLET (10 MG TOTAL) BY MOUTH ONCE DAILY. 30 tablet 11    fluticasone-umeclidin-vilanter (TRELEGY ELLIPTA) 100-62.5-25 mcg DsDv Inhale 1 Inhaler into the lungs once daily.      latanoprost 0.005 % ophthalmic solution       losartan (COZAAR) 50 MG tablet TAKE 1 TABLET (50 MG TOTAL) BY MOUTH ONCE DAILY. 90 tablet 1    megestrol (MEGACE) 400 mg/10 mL (40 mg/mL) Susp Take 10 mLs (400 mg total) by mouth once daily. 300 mL 11    quetiapine (SEROQUEL) 25 MG Tab Take 125 mg by mouth every evening.       VENTOLIN HFA 90 mcg/actuation inhaler       ciprofloxacin HCl (CIPRO) 500 MG tablet Take 1 tablet (500 mg total) by mouth 2 (two) times daily. 14 tablet 0    FLUZONE HIGH-DOSE 2017-18, PF, 180 mcg/0.5 mL vaccine TO BE ADMINISTERED BY PHARMACIST FOR IMMUNIZATION  0    mirtazapine (REMERON) 7.5 MG Tab Take 1 tablet (7.5 mg total) by mouth every evening. 30 tablet 11     No current facility-administered medications for this visit.        Review of patient's allergies indicates:  No Known Allergies  Objective:      Physical Exam   Constitutional: He is oriented to person, place, and time. No distress.   Disheveled and malodorous -    HENT:   Head: Normocephalic and atraumatic.   Right Ear: External ear normal.   Left Ear: External ear normal.   Nose: Nose normal.   Mouth/Throat: Oropharynx is clear and moist.   Eyes: Conjunctivae and EOM are normal. Right eye exhibits no discharge. Left eye exhibits no discharge. No scleral icterus.   Neck: Normal range of motion. Neck supple. No JVD present. No tracheal deviation present. No thyromegaly present.   Cardiovascular: Normal rate, regular rhythm, normal heart sounds and intact distal pulses. Exam reveals no gallop.   No murmur heard.  Pulmonary/Chest:  Effort normal. No respiratory distress. He has decreased breath sounds. He has no wheezes. He has no rales.   Abdominal: Soft. Bowel sounds are normal. He exhibits no distension and no mass. There is no tenderness.   Musculoskeletal: Normal range of motion. He exhibits no edema or tenderness.   Lymphadenopathy:     He has no cervical adenopathy.   Neurological: He is alert and oriented to person, place, and time.   Skin: Skin is warm, dry and intact. No rash noted. He is not diaphoretic. No erythema.   Psychiatric: He has a normal mood and affect. His behavior is normal. Judgment and thought content normal.       Lab Results   Component Value Date    WBC 8.8 01/03/2018    HGB 13.5 (L) 01/03/2018    HCT 40.7 01/03/2018     01/03/2018    CHOL 167 05/13/2010    TRIG 100 05/13/2010    HDL 58 05/13/2010    ALT 13 05/13/2010    AST 19 05/14/2018     05/14/2018    K 4.3 05/14/2018     05/14/2018    CREATININE 1.43 (H) 05/14/2018    BUN 21 (H) 05/14/2018    CO2 28.5 05/14/2018    TSH 2.71 01/03/2018       Assessment:       1. Urinary tract infection associated with nephrostomy catheter, initial encounter    2. Early onset Alzheimer's dementia without behavioral disturbance    3. Essential hypertension    4. Abnormal weight loss    5. Anorexia    6. 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 History of ureterostomy    8. CKD (chronic kidney disease) stage 3, GFR 30-59 ml/min        Plan:       Urinary tract infection associated with nephrostomy catheter, initial encounter  -     ciprofloxacin HCl (CIPRO) 500 MG tablet; Take 1 tablet (500 mg total) by mouth 2 (two) times daily.  Dispense: 14 tablet; Refill: 0    Early onset Alzheimer's dementia without behavioral disturbance  -     mirtazapine (REMERON) 7.5 MG Tab; Take 1 tablet (7.5 mg total) by mouth every evening.  Dispense: 30 tablet; Refill: 11    Essential hypertension- Controlled on above med regimen to include an ARB/ACE  -     TSH; Future; Expected  date: 11/15/2018  -     T4, free; Future; Expected date: 11/15/2018    Abnormal weight loss  -     CBC auto differential; Future; Expected date: 11/15/2018    Anorexia  -     mirtazapine (REMERON) 7.5 MG Tab; Take 1 tablet (7.5 mg total) by mouth every evening.  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    CKD (chronic kidney disease) stage 3, GFR 30-59 ml/min  -     Comprehensive metabolic panel; Future; Expected date: 11/15/2018  -     Urinalysis Microscopic; Future; Expected date: 11/15/2018    Other orders  -     Pneumococcal Polysaccharide Vaccine (23 Valent) (SQ/IM)  -     Influenza - High Dose (65+) (PF) (IM)

## 2018-11-17 ENCOUNTER — TELEPHONE (OUTPATIENT)
Dept: FAMILY MEDICINE | Facility: CLINIC | Age: 83
End: 2018-11-17

## 2018-11-29 DIAGNOSIS — N39.0 URINARY TRACT INFECTION ASSOCIATED WITH NEPHROSTOMY CATHETER, INITIAL ENCOUNTER: ICD-10-CM

## 2018-11-29 DIAGNOSIS — T83.512A URINARY TRACT INFECTION ASSOCIATED WITH NEPHROSTOMY CATHETER, INITIAL ENCOUNTER: ICD-10-CM

## 2018-11-30 NOTE — TELEPHONE ENCOUNTER
Attempted to call pts wife. No answer, unable to LVM as phone went to busy signal after 4-5 rings. Will attempt to call again.

## 2018-12-03 RX ORDER — CIPROFLOXACIN 500 MG/1
TABLET ORAL
Qty: 14 TABLET | Refills: 0 | OUTPATIENT
Start: 2018-12-03

## 2018-12-03 NOTE — TELEPHONE ENCOUNTER
Wife states no more signs of UTI. She was confused and did not know if he needed to continue. Explained unless he is still having issues, then no. You can cancel the cipro, system won't let me.

## 2019-01-06 DIAGNOSIS — E78.5 DYSLIPIDEMIA: ICD-10-CM

## 2019-01-08 RX ORDER — ATORVASTATIN CALCIUM 20 MG/1
20 TABLET, FILM COATED ORAL DAILY
Qty: 90 TABLET | Refills: 3 | Status: SHIPPED | OUTPATIENT
Start: 2019-01-08 | End: 2020-01-08

## 2019-02-05 ENCOUNTER — OFFICE VISIT (OUTPATIENT)
Dept: PULMONOLOGY | Facility: CLINIC | Age: 84
End: 2019-02-05
Payer: MEDICARE

## 2019-02-05 VITALS
SYSTOLIC BLOOD PRESSURE: 110 MMHG | OXYGEN SATURATION: 98 % | HEART RATE: 91 BPM | HEIGHT: 71 IN | BODY MASS INDEX: 17.22 KG/M2 | WEIGHT: 123 LBS | DIASTOLIC BLOOD PRESSURE: 70 MMHG

## 2019-02-05 DIAGNOSIS — J43.1 PANLOBULAR EMPHYSEMA: Primary | ICD-10-CM

## 2019-02-05 DIAGNOSIS — G30.0 EARLY ONSET ALZHEIMER'S DEMENTIA WITHOUT BEHAVIORAL DISTURBANCE: ICD-10-CM

## 2019-02-05 DIAGNOSIS — F02.80 EARLY ONSET ALZHEIMER'S DEMENTIA WITHOUT BEHAVIORAL DISTURBANCE: ICD-10-CM

## 2019-02-05 DIAGNOSIS — E46 MALNUTRITION, UNSPECIFIED TYPE: ICD-10-CM

## 2019-02-05 DIAGNOSIS — G47.34 NOCTURNAL HYPOXEMIA: ICD-10-CM

## 2019-02-05 PROCEDURE — 99214 PR OFFICE/OUTPT VISIT, EST, LEVL IV, 30-39 MIN: ICD-10-PCS | Mod: ,,, | Performed by: INTERNAL MEDICINE

## 2019-02-05 PROCEDURE — 99214 OFFICE O/P EST MOD 30 MIN: CPT | Mod: ,,, | Performed by: INTERNAL MEDICINE

## 2019-02-05 RX ORDER — BUDESONIDE AND FORMOTEROL FUMARATE DIHYDRATE 160; 4.5 UG/1; UG/1
AEROSOL RESPIRATORY (INHALATION)
COMMUNITY
End: 2019-02-05

## 2019-02-05 RX ORDER — DONEPEZIL HYDROCHLORIDE 10 MG/1
TABLET, FILM COATED ORAL
COMMUNITY
End: 2019-05-15

## 2019-02-05 RX ORDER — DORZOLAMIDE HYDROCHLORIDE AND TIMOLOL MALEATE PRESERVATIVE FREE 20; 5 MG/ML; MG/ML
SOLUTION/ DROPS OPHTHALMIC
COMMUNITY
End: 2019-05-15

## 2019-02-05 RX ORDER — MEMANTINE HYDROCHLORIDE 10 MG/1
TABLET ORAL
COMMUNITY
End: 2019-05-15

## 2019-02-05 RX ORDER — ACETAMINOPHEN 500 MG
TABLET ORAL
COMMUNITY

## 2019-02-05 RX ORDER — MEMANTINE HYDROCHLORIDE 28 MG/1
CAPSULE, EXTENDED RELEASE ORAL
COMMUNITY
End: 2019-05-15

## 2019-02-05 RX ORDER — DONEPEZIL HYDROCHLORIDE 5 MG/1
TABLET, FILM COATED ORAL
COMMUNITY
End: 2019-05-15

## 2019-02-05 NOTE — PATIENT INSTRUCTIONS
Alzheimer's Dementia and Caregiver Support   Alzheimer's dementia (AD) is a chronic, progressive condition that affects the brain. It causes a gradual loss of memory and higher intellectual functions. A person with AD may have trouble recognizing familiar people and places, or knowing what day it is. The persons memory, judgment, and decision-making may also be affected. In severe cases, the person may not respond when someone talks to him or her.  AD is the most common form of dementia. Doctors dont fully understand what causes AD. It has no cure. But medicines can treat some of the symptoms.  Home care  These tips can help you care for a person with AD at home:  · A responsible person must be with someone who has advanced AD at all times.  He or she should not be left alone or unsupervised.  · In the case of advanced AD, keep all medicines in a secure place. They should be under the caregivers control. A person with advanced AD should not be allowed to take his or her own medicines. This needs to be supervised by the caregiver.  Here are ways to help a person with dementia:  Activities  Keep to a daily routine. Changes in routine can cause stress for someone with dementia. Make a schedule for common daily tasks. These include bathing, dressing, taking medicines, eating meals, going for walks, and going to bed.  Communication  When talking to a person with dementia, talk slowly and clearly. Use a gentle tone of voice. Choose short, simple words and sentences. Ask one question at a time. Dont interrupt, criticize, or argue. Be calm and supportive. Use friendly facial expressions. Use pointing and touching to help communicate. If the person has a loss of long-term memory, dont ask questions about past events. Instead, talk about what is happening now.  Behavioral tips  Use lists, signs, family photos, clocks, and calendars as memory aids. Label cabinets and drawers. Try to distract, not confront, the person.  When he or she becomes frustrated or upset, direct the persons attention to eating or some other interesting activity.  Medical-legal tips  Talk with your doctor or  about getting a power of  for healthcare and for financial decisions. It is best to do this while the person can still sign legal documents and make his or her own legal decisions. Otherwise, you'll need a court order.  Support for the caregiver  As the caregiver, you will need a lot of support for yourself. Caring for a person with dementia is a full-time job. It can drain your emotions and lead to frustration and anger toward the one you love. It is common to have feelings of grief over losing the relationship that you once had. As a caregiver to someone with dementia, you are at higher risk for depression, anxiety and stress.  Here are some tips to help you cope with being a caregiver:  · Learn about dementia and Alzheimers disease so you know what to expect.  · Find out about the resources in your community, including adult day-care programs. Ask your healthcare provider for a referral to a , if needed.  · Take care of yourself with a healthy diet, exercise, and plenty of rest.  · Ask for help. Share some of the caretaking duties with family and friends.  · Make personal time for yourself. This is essential! Consider hiring an in-home sitter or home health aide.  · Seek counseling or join a caregivers support group. Don't isolate yourself or try to cope with this alone. In a support group, you can learn from others in a similar situation.  · Visit the Alzheimers Association website (www.alz.org) for more information.  Follow-up care  Follow up with the persons healthcare provider, or as advised.  When to seek medical advice  Call your loved-one's healthcare provider right away if any of these occur:  · Frequent falls  · The person refuses to eat or drink  · Violent behavior or behavior becomes too difficult to manage  at home  · Increased drowsiness, or failure to respond normally  · Headache or nausea that gets worse, or repeated vomiting  · Numbness or weakness of the face, an arm, or a leg  · Slurred speech, trouble speaking, walking, or seeing  · Fainting spell, dizziness, or seizure  · Unexplained fever of 100.4º F (38.0º C) or higher  · Seizure like activity (twitching, staring episodes, lip smacking, sudden periods of worsening confusion)  Date Last Reviewed: 8/1/2016 © 2000-2017 [employer]. [address] [place] [address]. All rights reserved. This information is not intended as a substitute for professional medical care. Always follow your healthcare professional's instructions.       Continue Trelegy daily  Continue oxygen at night as much as possible  Small frequent meals continue with the ensure

## 2019-02-05 NOTE — PROGRESS NOTES
SUBJECTIVE:    Patient ID: John Knight is a 85 y.o. male.    Chief Complaint: Follow-up (6 mo check )    HPI   Patient here today with his wife. She states the Alzheimers is getting much worse.  He is using Trelegy daily and Ventolin as needed.  She states getting him to eat is hard.  He takes his oxygen off at night as well is urostomy stuff.   Past Medical History:   Diagnosis Date    AAA (abdominal aortic aneurysm)     Alzheimer's disease     Arthritis     Asthma     Bladder cancer     Colon cancer     COPD (chronic obstructive pulmonary disease)     Dementia     Emphysema of lung     Glaucoma     Retinopathy     Skin cancer      Past Surgical History:   Procedure Laterality Date    AAA stent      BRAIN SURGERY      cataracts      COLON SURGERY      COLONOSCOPY      HERNIA REPAIR      TONSILLECTOMY      TRANSURETHRAL RESECTION OF BLADDER TUMOR      urethra resected      urostomy       Family History   Problem Relation Age of Onset    No Known Problems Father     Arthritis Maternal Grandmother         Social History:   Marital Status:   Occupation: Data Unavailable  Alcohol History:  reports that he drinks about 0.6 oz of alcohol per week.  Tobacco History:  reports that he has quit smoking. His smoking use included cigarettes. He has a 40.00 pack-year smoking history. he has never used smokeless tobacco.  Drug History:  reports that he does not use drugs.    Review of patient's allergies indicates:  No Known Allergies    Current Outpatient Medications   Medication Sig Dispense Refill    acetaminophen (TYLENOL) 500 MG tablet acetaminophen 500 mg tablet   Take 2 tablets every day by oral route.      atorvastatin (LIPITOR) 20 MG tablet TAKE 1 TABLET (20 MG TOTAL) BY MOUTH ONCE DAILY. 90 tablet 3    ciprofloxacin HCl (CIPRO) 500 MG tablet Take 1 tablet (500 mg total) by mouth 2 (two) times daily. 14 tablet 0    citalopram (CELEXA) 10 MG tablet TAKE 1 TABLET (10 MG TOTAL) BY  MOUTH ONCE DAILY. 30 tablet 11    dorzolamide-timolol, PF, (COSOPT PF) 2-0.5 % Dpet ophthalmic solution dorzolamide 2 %-timolol 0.5 % (PF) eye drops   1 gtt OU BID      FLUZONE HIGH-DOSE 2017-18, PF, 180 mcg/0.5 mL vaccine TO BE ADMINISTERED BY PHARMACIST FOR IMMUNIZATION  0    latanoprost 0.005 % ophthalmic solution       losartan (COZAAR) 50 MG tablet TAKE 1 TABLET (50 MG TOTAL) BY MOUTH ONCE DAILY. 90 tablet 1    memantine (NAMENDA) 10 MG Tab memantine 10 mg tablet   Take 1 tablet twice a day by oral route.      quetiapine (SEROQUEL) 25 MG Tab Take 125 mg by mouth every evening.       VENTOLIN HFA 90 mcg/actuation inhaler       arginine-glutamine-calcium HMB (FIONA) 7-7-1.5 gram PwPk Take 1 packet by mouth 2 (two) times daily. 60 each 11    donepezil (ARICEPT) 10 MG tablet donepezil 10 mg tablet   Take 1 tablet every day by oral route in the morning with food.      donepezil (ARICEPT) 5 MG tablet donepezil 5 mg tablet   Take 1 tablet every day by oral route in the morning with food.      fluticasone-umeclidin-vilanter (TRELEGY ELLIPTA) 100-62.5-25 mcg DsDv Inhale 1 puff into the lungs once daily. 1 each 6    megestrol (MEGACE) 400 mg/10 mL (40 mg/mL) Susp Take 10 mLs (400 mg total) by mouth once daily. 300 mL 11    memantine (NAMENDA XR) 28 mg CSpX Namenda XR 28 mg capsule sprinkle,extended release   TAKE ONE CAPSULE BY MOUTH EVERY DAY      mirtazapine (REMERON) 7.5 MG Tab Take 1 tablet (7.5 mg total) by mouth every evening. 30 tablet 11     No current facility-administered medications for this visit.            Review of Systems  General: Feeling Well.  Eyes: Vision is good.  ENT:  No sinusitis or pharyngitis.   Heart:: No chest pain or palpitations.  Lungs: breathing is stable, he was having issues a few weeks ago   GI: No Nausea, vomiting, constipation, diarrhea, or reflux.  : urostomy   Musculoskeletal: No joint pain or myalgias.  Skin: No lesions or rashes.  Neuro: Alzheimers is getting worse  "  Lymph: No edema or adenopathy.  Psych: No anxiety or depression.  Endo: weight loss .    OBJECTIVE:      /70 (BP Location: Left arm, Patient Position: Sitting)   Pulse 91   Ht 5' 11" (1.803 m)   Wt 55.8 kg (123 lb)   SpO2 98%   BMI 17.16 kg/m²     Physical Exam  GENERAL: Older patient in no distress.  HEENT: Pupils equal and reactive. Extraocular movements intact. Nose intact.      Pharynx moist.  NECK: Supple.   HEART: Regular rate and rhythm. No murmur or gallop auscultated.  LUNGS: Clear to auscultation and percussion. Lung excursion symmetrical. No change in fremitus. No adventitial noises.  ABDOMEN: Bowel sounds present. Non-tender, no masses palpated.  EXTREMITIES: Normal muscle tone and joint movement, no cyanosis or clubbing.   LYMPHATICS: No adenopathy palpated, no edema.  SKIN: Dry, intact, no lesions.   NEURO: Cranial nerves II-XII intact. Motor strength 5/5 bilaterally, upper and lower extremities.  PSYCH: Appropriate affect.    Alie Shanks NP served in the capacity as a "scribe" for this patient encounter.  A "face to face" encounter occurred with Dr. Laboy on this date  The treatment plan and medical decision making is outlined below:         Assessment:       1. Panlobular emphysema    2. Malnutrition, unspecified type    3. 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  Dispense: 1 each; Refill: 6      Continue Trelegy daily  Continue oxygen at night as much as possible  Small frequent meals continue with the ensure   Follow-up in about 6 months (around 8/5/2019).        "

## 2019-03-22 LAB
ALBUMIN SERPL-MCNC: 3.1 G/DL (ref 3.1–4.7)
ALP SERPL-CCNC: 92 IU/L (ref 40–104)
ALT (SGPT): 20 IU/L (ref 3–33)
AST SERPL-CCNC: 25 IU/L (ref 10–40)
BASOPHILS NFR BLD: 0.1 K/UL (ref 0–0.2)
BASOPHILS NFR BLD: 0.8 %
BILIRUB SERPL-MCNC: 0.4 MG/DL (ref 0.3–1)
BUN SERPL-MCNC: 29 MG/DL (ref 8–20)
CALCIUM SERPL-MCNC: 8.7 MG/DL (ref 7.7–10.4)
CHLORIDE: 107 MMOL/L (ref 98–110)
CO2 SERPL-SCNC: 25.7 MMOL/L (ref 22.8–31.6)
CREATININE: 1.91 MG/DL (ref 0.6–1.4)
EOSINOPHIL NFR BLD: 0.3 K/UL (ref 0–0.7)
EOSINOPHIL NFR BLD: 3.1 %
ERYTHROCYTE [DISTWIDTH] IN BLOOD BY AUTOMATED COUNT: 14.7 % (ref 11.7–14.9)
GLUCOSE: 165 MG/DL (ref 70–99)
GRAN #: 6.3 K/UL (ref 1.4–6.5)
GRAN%: 65.6 %
HCT VFR BLD AUTO: 35.7 % (ref 39–55)
HGB BLD-MCNC: 10.8 G/DL (ref 14–16)
IMMATURE GRANS (ABS): 0 K/UL (ref 0–1)
IMMATURE GRANULOCYTES: 0.4 %
LYMPH #: 2 K/UL (ref 1.2–3.4)
LYMPH%: 20.7 %
MCH RBC QN AUTO: 31.2 PG (ref 25–35)
MCHC RBC AUTO-ENTMCNC: 30.3 G/DL (ref 31–36)
MCV RBC AUTO: 103.2 FL (ref 80–100)
MONO #: 0.9 K/UL (ref 0.1–0.6)
MONO%: 9.4 %
NUCLEATED RBCS: 0 %
PHOSPHATE FLD-MCNC: 2.2 MG/DL (ref 2.5–4.9)
PLATELET # BLD AUTO: 240 K/UL (ref 140–440)
PMV BLD AUTO: 9.2 FL (ref 8.8–12.7)
POTASSIUM SERPL-SCNC: 4.4 MMOL/L (ref 3.5–5)
PROT SERPL-MCNC: 6.8 G/DL (ref 6–8.2)
RBC # BLD AUTO: 3.46 M/UL (ref 4.3–5.9)
SODIUM: 141 MMOL/L (ref 134–144)
T4 FREE SP9 P CHAL SERPL-SCNC: 0.69 NG/DL (ref 0.45–1.27)
TSH SERPL DL<=0.005 MIU/L-ACNC: 4.74 ULU/ML (ref 0.3–5.6)
WBC # BLD AUTO: 9.6 K/UL (ref 5–10)

## 2019-03-23 DIAGNOSIS — N39.0 URINARY TRACT INFECTION ASSOCIATED WITH NEPHROSTOMY CATHETER, INITIAL ENCOUNTER: ICD-10-CM

## 2019-03-23 DIAGNOSIS — T83.512A URINARY TRACT INFECTION ASSOCIATED WITH NEPHROSTOMY CATHETER, INITIAL ENCOUNTER: ICD-10-CM

## 2019-03-23 RX ORDER — SODIUM,POTASSIUM PHOSPHATES 280-250MG
1 POWDER IN PACKET (EA) ORAL DAILY
Qty: 5 PACKET | Refills: 0 | Status: SHIPPED | OUTPATIENT
Start: 2019-03-23

## 2019-03-23 RX ORDER — CIPROFLOXACIN 500 MG/1
500 TABLET ORAL 2 TIMES DAILY
Qty: 14 TABLET | Refills: 0 | Status: SHIPPED | OUTPATIENT
Start: 2019-03-23 | End: 2019-05-15

## 2019-03-25 ENCOUNTER — TELEPHONE (OUTPATIENT)
Dept: FAMILY MEDICINE | Facility: CLINIC | Age: 84
End: 2019-03-25

## 2019-03-25 NOTE — TELEPHONE ENCOUNTER
----- Message from Idania Connors MD sent at 3/23/2019  4:12 PM CDT -----  His urine is from the OSTOMY ?  It looks like he has an infection.

## 2019-05-08 RX ORDER — MEGESTROL ACETATE 40 MG/ML
400 SUSPENSION ORAL DAILY
Qty: 300 ML | Refills: 0 | Status: SHIPPED | OUTPATIENT
Start: 2019-05-08 | End: 2020-05-07

## 2019-05-09 DIAGNOSIS — I10 ESSENTIAL HYPERTENSION: ICD-10-CM

## 2019-05-09 RX ORDER — LOSARTAN POTASSIUM 50 MG/1
50 TABLET ORAL DAILY
Qty: 90 TABLET | Refills: 1 | Status: SHIPPED | OUTPATIENT
Start: 2019-05-09 | End: 2019-08-06

## 2019-05-15 ENCOUNTER — OFFICE VISIT (OUTPATIENT)
Dept: FAMILY MEDICINE | Facility: CLINIC | Age: 84
End: 2019-05-15
Payer: MEDICARE

## 2019-05-15 VITALS
DIASTOLIC BLOOD PRESSURE: 70 MMHG | HEART RATE: 62 BPM | HEIGHT: 71 IN | SYSTOLIC BLOOD PRESSURE: 136 MMHG | TEMPERATURE: 98 F | BODY MASS INDEX: 18.34 KG/M2 | RESPIRATION RATE: 16 BRPM | WEIGHT: 131 LBS | OXYGEN SATURATION: 95 %

## 2019-05-15 DIAGNOSIS — R63.6 UNDERWEIGHT: ICD-10-CM

## 2019-05-15 DIAGNOSIS — F02.80 LATE ONSET ALZHEIMER'S DISEASE WITHOUT BEHAVIORAL DISTURBANCE: ICD-10-CM

## 2019-05-15 DIAGNOSIS — G30.1 LATE ONSET ALZHEIMER'S DISEASE WITHOUT BEHAVIORAL DISTURBANCE: ICD-10-CM

## 2019-05-15 DIAGNOSIS — E03.8 SUBCLINICAL HYPOTHYROIDISM: ICD-10-CM

## 2019-05-15 DIAGNOSIS — D64.9 ANEMIA, UNSPECIFIED TYPE: ICD-10-CM

## 2019-05-15 DIAGNOSIS — Z85.51 HISTORY OF CARCINOMA OF BLADDER: ICD-10-CM

## 2019-05-15 DIAGNOSIS — N18.30 CKD (CHRONIC KIDNEY DISEASE) STAGE 3, GFR 30-59 ML/MIN: Primary | ICD-10-CM

## 2019-05-15 DIAGNOSIS — Z93.2 ILEOSTOMY IN PLACE: ICD-10-CM

## 2019-05-15 PROBLEM — D50.0 IRON DEFICIENCY ANEMIA DUE TO CHRONIC BLOOD LOSS: Status: ACTIVE | Noted: 2019-05-15

## 2019-05-15 PROBLEM — E83.39 HYPOPHOSPHATEMIA: Status: ACTIVE | Noted: 2019-05-15

## 2019-05-15 LAB
ALBUMIN SERPL-MCNC: 3.3 G/DL (ref 3.1–4.7)
ALP SERPL-CCNC: 88 IU/L (ref 40–104)
ALT (SGPT): 15 IU/L (ref 3–33)
AST SERPL-CCNC: 16 IU/L (ref 10–40)
BASOPHILS NFR BLD: 0.1 K/UL (ref 0–0.2)
BASOPHILS NFR BLD: 1 %
BILIRUB SERPL-MCNC: 0.5 MG/DL (ref 0.3–1)
BUN SERPL-MCNC: 38 MG/DL (ref 8–20)
CALCIUM SERPL-MCNC: 9.5 MG/DL (ref 7.7–10.4)
CHLORIDE: 111 MMOL/L (ref 98–110)
CO2 SERPL-SCNC: 27.3 MMOL/L (ref 22.8–31.6)
CREATININE: 1.95 MG/DL (ref 0.6–1.4)
EOSINOPHIL NFR BLD: 0.4 K/UL (ref 0–0.7)
EOSINOPHIL NFR BLD: 4 %
ERYTHROCYTE [DISTWIDTH] IN BLOOD BY AUTOMATED COUNT: 13.2 % (ref 11.7–14.9)
FERRITIN SERPL-MCNC: 73 NG/ML (ref 37–201)
GLUCOSE: 104 MG/DL (ref 70–99)
GRAN #: 6.6 K/UL (ref 1.4–6.5)
GRAN%: 66.5 %
HCT VFR BLD AUTO: 38.2 % (ref 39–55)
HGB BLD-MCNC: 11.5 G/DL (ref 14–16)
IMMATURE GRANS (ABS): 0 K/UL (ref 0–1)
IMMATURE GRANULOCYTES: 0.4 %
LYMPH #: 1.9 K/UL (ref 1.2–3.4)
LYMPH%: 19 %
MCH RBC QN AUTO: 30.1 PG (ref 25–35)
MCHC RBC AUTO-ENTMCNC: 30.1 G/DL (ref 31–36)
MCV RBC AUTO: 100 FL (ref 80–100)
MONO #: 0.9 K/UL (ref 0.1–0.6)
MONO%: 9.1 %
NUCLEATED RBCS: 0 %
PLATELET # BLD AUTO: 282 K/UL (ref 140–440)
PMV BLD AUTO: 9.1 FL (ref 8.8–12.7)
POTASSIUM SERPL-SCNC: 5.1 MMOL/L (ref 3.5–5)
PROT SERPL-MCNC: 6.9 G/DL (ref 6–8.2)
RBC # BLD AUTO: 3.82 M/UL (ref 4.3–5.9)
SODIUM: 145 MMOL/L (ref 134–144)
TSH SERPL DL<=0.005 MIU/L-ACNC: 3.77 ULU/ML (ref 0.3–5.6)
WBC # BLD AUTO: 9.9 K/UL (ref 5–10)

## 2019-05-15 PROCEDURE — 99214 OFFICE O/P EST MOD 30 MIN: CPT | Mod: ,,, | Performed by: INTERNAL MEDICINE

## 2019-05-15 PROCEDURE — 99214 PR OFFICE/OUTPT VISIT, EST, LEVL IV, 30-39 MIN: ICD-10-PCS | Mod: ,,, | Performed by: INTERNAL MEDICINE

## 2019-05-15 RX ORDER — TURMERIC 400 MG
CAPSULE ORAL
COMMUNITY

## 2019-05-15 RX ORDER — VITAMIN K2 40 MCG
TABLET ORAL
COMMUNITY

## 2019-05-15 NOTE — PROGRESS NOTES
Subjective:       Patient ID: John Knight is a 85 y.o. male.    Chief Complaint: Follow-up and Hypertension    85-year-old gentleman who is here for six-month follow-up for chronic kidney disease, hypertension dyslipidemia most importantly end-stage Alzheimer's disease. He does not have agitation with behavior that makes him difficult to care for ; however he is a lot of physical work, has a ileostomy bag, refuses to eat and bath most days and has fallen a couple of times. His ileostomy bag that he used to change himself is very difficult and he is going through many good bags very fast. The wife is here with his daughter and are discussing the options of at home hospice versus assisted living. I did go over the different levels of care and that he could have hospice at the assisted living. They have looked at [facility]  already. He has gained 3 pounds since I saw him 3 months ago. He is taking Megace.    Review of Systems   Constitutional: Negative for activity change, diaphoresis, fatigue and fever.   HENT: Negative for congestion, ear pain, postnasal drip, sinus pressure, sore throat and trouble swallowing.    Eyes: Negative for pain.   Respiratory: Negative for cough, chest tightness, shortness of breath and wheezing.    Cardiovascular: Negative for chest pain, palpitations and leg swelling.   Gastrointestinal: Negative for abdominal distention, abdominal pain, blood in stool, constipation and diarrhea.   Endocrine: Negative for cold intolerance and heat intolerance.   Genitourinary: Negative for decreased urine volume, difficulty urinating, dysuria, flank pain, frequency and hematuria.   Musculoskeletal: Negative for arthralgias, back pain, joint swelling, myalgias and neck pain.   Skin: Negative for pallor, rash and wound.   Neurological: Negative for tremors, syncope, weakness, light-headedness, numbness and headaches.   Hematological: Negative for adenopathy.   Psychiatric/Behavioral: Positive for  confusion and sleep disturbance. Negative for decreased concentration, hallucinations, self-injury and suicidal ideas. The patient is not nervous/anxious.        Past Medical History:   Diagnosis Date    AAA (abdominal aortic aneurysm)     Alzheimer's disease     Arthritis     Asthma     Bladder cancer     Colon cancer     COPD (chronic obstructive pulmonary disease)     Dementia     Emphysema of lung     Glaucoma     Retinopathy     Skin cancer        Past Surgical History:   Procedure Laterality Date    AAA stent      BRAIN SURGERY      cataracts      COLON SURGERY      COLONOSCOPY      HERNIA REPAIR      TONSILLECTOMY      TRANSURETHRAL RESECTION OF BLADDER TUMOR      urethra resected      urostomy         Family History   Problem Relation Age of Onset    No Known Problems Father     Arthritis Maternal Grandmother        Social History     Socioeconomic History    Marital status:      Spouse name: Not on file    Number of children: Not on file    Years of education: Not on file    Highest education level: Not on file   Occupational History    Not on file   Social Needs    Financial resource strain: Not on file    Food insecurity:     Worry: Not on file     Inability: Not on file    Transportation needs:     Medical: Not on file     Non-medical: Not on file   Tobacco Use    Smoking status: Former Smoker     Packs/day: 1.00     Years: 40.00     Pack years: 40.00     Types: Cigarettes    Smokeless tobacco: Never Used   Substance and Sexual Activity    Alcohol use: Yes     Alcohol/week: 0.6 oz     Types: 1 Cans of beer per week     Comment: occasionally; no longer dringking beer since on Seroquel    Drug use: No    Sexual activity: Not Currently   Lifestyle    Physical activity:     Days per week: Not on file     Minutes per session: Not on file    Stress: Not at all   Relationships    Social connections:     Talks on phone: Not on file     Gets together: Not on file      Attends M[employer] service: Not on file     Active member of club or organization: Not on file     Attends meetings of clubs or organizations: Not on file     Relationship status: Not on file   Other Topics Concern    Not on file   Social History Narrative    Not on file       Current Outpatient Medications   Medication Sig Dispense Refill    acetaminophen (TYLENOL) 500 MG tablet acetaminophen 500 mg tablet   Take 2 tablets every day by oral route.      atorvastatin (LIPITOR) 20 MG tablet TAKE 1 TABLET (20 MG TOTAL) BY MOUTH ONCE DAILY. 90 tablet 3    citalopram (CELEXA) 10 MG tablet TAKE 1 TABLET (10 MG TOTAL) BY MOUTH ONCE DAILY. 30 tablet 11    fluticasone-umeclidin-vilanter (TRELEGY ELLIPTA) 100-62.5-25 mcg DsDv Inhale 1 puff into the lungs once daily. 1 each 6    FLUZONE HIGH-DOSE 2017-18, PF, 180 mcg/0.5 mL vaccine TO BE ADMINISTERED BY PHARMACIST FOR IMMUNIZATION  0    l-methylfolate-b2-b6-b12 (CEREFOLIN) 6-5-50-1 mg Tab Take 1 tablet by mouth once daily. 30 tablet 11    latanoprost 0.005 % ophthalmic solution       losartan (COZAAR) 50 MG tablet TAKE 1 TABLET (50 MG TOTAL) BY MOUTH ONCE DAILY. 90 tablet 1    megestrol (MEGACE) 400 mg/10 mL (40 mg/mL) Susp Take 10 mLs (400 mg total) by mouth once daily. 300 mL 0    potassium, sodium phosphates (PHOS-NAK) 280-160-250 mg PwPk Take 1 packet by mouth once daily. 5 packet 0    quetiapine (SEROQUEL) 25 MG Tab Take 125 mg by mouth every evening.       VENTOLIN HFA 90 mcg/actuation inhaler       turmeric 400 mg Cap turmeric   1 QD      vitamin K2 40 mcg Tab vitamin K2   One Tablet Daily       No current facility-administered medications for this visit.        Review of patient's allergies indicates:  No Known Allergies  Objective:      Physical Exam   Constitutional: He appears well-developed and well-nourished. No distress.   HENT:   Head: Normocephalic and atraumatic.   Right Ear: External ear normal.   Left Ear: External ear normal.   Nose: Nose  normal.   Mouth/Throat: Oropharynx is clear and moist.   Eyes: Conjunctivae and EOM are normal. Right eye exhibits no discharge. Left eye exhibits no discharge. No scleral icterus.   Neck: Normal range of motion. Neck supple. No JVD present. No tracheal deviation present. No thyromegaly present.   Cardiovascular: Normal rate, regular rhythm, normal heart sounds and intact distal pulses. Exam reveals no gallop.   No murmur heard.  Pulmonary/Chest: Effort normal and breath sounds normal. No respiratory distress. He has no wheezes. He has no rales.   Abdominal: Soft. Bowel sounds are normal. He exhibits no distension and no mass. There is no tenderness.   Musculoskeletal: Normal range of motion. He exhibits no edema or tenderness.   Lymphadenopathy:     He has no cervical adenopathy.   Neurological: He is alert.   Skin: Skin is warm and dry. No rash noted. He is not diaphoretic. No erythema.   Psychiatric: He has a normal mood and affect. His speech is normal and behavior is normal. Judgment and thought content normal. Cognition and memory are impaired. He exhibits abnormal recent memory and abnormal remote memory.       Lab Results   Component Value Date    WBC 9.9 05/15/2019    HGB 11.5 (L) 05/15/2019    HCT 38.2 (L) 05/15/2019     05/15/2019    CHOL 167 05/13/2010    TRIG 100 05/13/2010    HDL 58 05/13/2010    ALT 13 05/13/2010    AST 16 05/15/2019     (H) 05/15/2019    K 5.1 (H) 05/15/2019     (H) 05/15/2019    CREATININE 1.95 (H) 05/15/2019    BUN 38 (H) 05/15/2019    CO2 27.3 05/15/2019    TSH 3.77 05/15/2019       Assessment:       1. CKD (chronic kidney disease) stage 3, GFR 30-59 ml/min    2. Late onset Alzheimer's disease without behavioral disturbance    3. Underweight    4. 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 Ileostomy in place    6. Anemia, unspecified type    7. 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

## 2019-05-17 LAB — HOMOCYSTEINE: 14.3 UMOL/L (ref 0–15)

## 2019-05-20 ENCOUNTER — TELEPHONE (OUTPATIENT)
Dept: FAMILY MEDICINE | Facility: CLINIC | Age: 84
End: 2019-05-20

## 2019-05-28 ENCOUNTER — TELEPHONE (OUTPATIENT)
Dept: FAMILY MEDICINE | Facility: CLINIC | Age: 84
End: 2019-05-28

## 2019-06-04 ENCOUNTER — TELEPHONE (OUTPATIENT)
Dept: FAMILY MEDICINE | Facility: CLINIC | Age: 84
End: 2019-06-04

## 2019-06-04 NOTE — TELEPHONE ENCOUNTER
He just got out all the hospital with obstruction and hydronephrosis secondary to a kidney stone and lithotripsy. Are they referring to a repeat renal ultrasound to assure that the hydronephrosis is resolving? When is he to see urology?

## 2019-06-04 NOTE — TELEPHONE ENCOUNTER
Spoke to Anna, results given.  She said they will do otc vit. B12.    They have appt. Tomorrow but was suppose to get some kind of scan done before visit. I didn't see any orders. Do you still want them to come tomorrow? I reviewed last visit, I didn't see any mention of a scan to be done before next visit.

## 2019-08-02 RX ORDER — HYDROCODONE BITARTRATE AND ACETAMINOPHEN 5; 325 MG/1; MG/1
TABLET ORAL
COMMUNITY
Start: 2019-06-01

## 2019-08-02 RX ORDER — SULFAMETHOXAZOLE AND TRIMETHOPRIM 800; 160 MG/1; MG/1
TABLET ORAL
COMMUNITY
Start: 2019-06-01 | End: 2019-08-06

## 2019-08-02 RX ORDER — MIRTAZAPINE 7.5 MG/1
TABLET, FILM COATED ORAL
COMMUNITY
Start: 2019-06-02 | End: 2019-08-06

## 2019-08-06 ENCOUNTER — OFFICE VISIT (OUTPATIENT)
Dept: PULMONOLOGY | Facility: CLINIC | Age: 84
End: 2019-08-06
Payer: MEDICARE

## 2019-08-06 VITALS
HEIGHT: 71 IN | WEIGHT: 124 LBS | HEART RATE: 100 BPM | OXYGEN SATURATION: 96 % | BODY MASS INDEX: 17.36 KG/M2 | SYSTOLIC BLOOD PRESSURE: 125 MMHG | DIASTOLIC BLOOD PRESSURE: 60 MMHG

## 2019-08-06 DIAGNOSIS — G47.34 NOCTURNAL HYPOXEMIA: ICD-10-CM

## 2019-08-06 DIAGNOSIS — J43.1 PANLOBULAR EMPHYSEMA: Primary | ICD-10-CM

## 2019-08-06 PROCEDURE — 99214 PR OFFICE/OUTPT VISIT, EST, LEVL IV, 30-39 MIN: ICD-10-PCS | Mod: S$GLB,,, | Performed by: INTERNAL MEDICINE

## 2019-08-06 PROCEDURE — 99214 OFFICE O/P EST MOD 30 MIN: CPT | Mod: S$GLB,,, | Performed by: INTERNAL MEDICINE

## 2019-08-06 RX ORDER — ALBUTEROL SULFATE 90 UG/1
2 AEROSOL, METERED RESPIRATORY (INHALATION) EVERY 6 HOURS PRN
Qty: 1 INHALER | Refills: 6 | Status: SHIPPED | OUTPATIENT
Start: 2019-08-06

## 2019-08-06 RX ORDER — DORZOLAMIDE HYDROCHLORIDE AND TIMOLOL MALEATE 20; 5 MG/ML; MG/ML
1 SOLUTION/ DROPS OPHTHALMIC NIGHTLY
COMMUNITY

## 2019-08-06 RX ORDER — MEMANTINE HYDROCHLORIDE 10 MG/1
5 TABLET ORAL 2 TIMES DAILY
COMMUNITY

## 2019-08-06 NOTE — PROGRESS NOTES
SUBJECTIVE:    Patient ID: John Knight is a 85 y.o. male.    Chief Complaint: Follow-up; Emphysema; and COPD    HPI   Patient here today with his wife. She states the Alzheimers is getting much worse.  He is using Trelegy daily.  He has not been sleeping on his oxygen, wife states she tries.  His appetite is still not great but has not lost any weight since February.  His wife feels his breathing is getting worse.    Past Medical History:   Diagnosis Date    AAA (abdominal aortic aneurysm)     Alzheimer's disease     Arthritis     Asthma     Bladder cancer     Colon cancer     COPD (chronic obstructive pulmonary disease)     Dementia     Emphysema of lung     Glaucoma     Retinopathy     Skin cancer      Past Surgical History:   Procedure Laterality Date    AAA stent      BRAIN SURGERY      cataracts      COLON SURGERY      COLONOSCOPY      HERNIA REPAIR      TONSILLECTOMY      TRANSURETHRAL RESECTION OF BLADDER TUMOR      urethra resected      urostomy       Family History   Problem Relation Age of Onset    No Known Problems Father     Arthritis Maternal Grandmother         Social History:   Marital Status:   Occupation: retired    Alcohol History:  reports that he drinks about 0.6 oz of alcohol per week.  Tobacco History:  reports that he has quit smoking. His smoking use included cigarettes. He has a 40.00 pack-year smoking history. He has never used smokeless tobacco.  Drug History:  reports that he does not use drugs.    Review of patient's allergies indicates:  No Known Allergies    Current Outpatient Medications   Medication Sig Dispense Refill    acetaminophen (TYLENOL) 500 MG tablet acetaminophen 500 mg tablet   Take 2 tablets every day by oral route.      atorvastatin (LIPITOR) 20 MG tablet TAKE 1 TABLET (20 MG TOTAL) BY MOUTH ONCE DAILY. (Patient taking differently: Take 25 mg by mouth once daily. ) 90 tablet 3    citalopram (CELEXA) 10 MG tablet TAKE 1  "TABLET (10 MG TOTAL) BY MOUTH ONCE DAILY. 30 tablet 11    dorzolamide-timolol 2-0.5% (COSOPT) 22.3-6.8 mg/mL ophthalmic solution Place 1 drop into both eyes nightly.      fluticasone-umeclidin-vilanter (TRELEGY ELLIPTA) 100-62.5-25 mcg DsDv Inhale 1 puff into the lungs once daily. 1 each 6    FLUZONE HIGH-DOSE 2017-18, PF, 180 mcg/0.5 mL vaccine TO BE ADMINISTERED BY PHARMACIST FOR IMMUNIZATION  0    HYDROcodone-acetaminophen (NORCO) 5-325 mg per tablet       latanoprost 0.005 % ophthalmic solution       megestrol (MEGACE) 400 mg/10 mL (40 mg/mL) Susp Take 10 mLs (400 mg total) by mouth once daily. 300 mL 0    memantine (NAMENDA) 10 MG Tab Take 5 mg by mouth 2 (two) times daily.      potassium, sodium phosphates (PHOS-NAK) 280-160-250 mg PwPk Take 1 packet by mouth once daily. 5 packet 0    quetiapine (SEROQUEL) 25 MG Tab Take 125 mg by mouth every evening.       turmeric 400 mg Cap turmeric   1 QD      vitamin K2 40 mcg Tab vitamin K2   One Tablet Daily      fluticasone-umeclidin-vilanter (TRELEGY ELLIPTA) 100-62.5-25 mcg DsDv Inhale 1 puff into the lungs once daily. 1 each 6    l-methylfolate-b2-b6-b12 (CEREFOLIN) 6-5-50-1 mg Tab Take 1 tablet by mouth once daily. 30 tablet 11     No current facility-administered medications for this visit.            Review of Systems  General: Feeling Well.  Eyes: Vision is good.  ENT:  No sinusitis or pharyngitis.   Heart:: No chest pain or palpitations.  Lungs: wife feels that his breathing is worse.    GI: still does not have a good appetite .  : urostomy   Musculoskeletal: No joint pain or myalgias.  Skin: No lesions or rashes.  Neuro: Alzheimers is getting worse   Lymph: No edema or adenopathy.  Psych: No anxiety or depression.  Endo: weight stable since last visit     OBJECTIVE:      /60 (BP Location: Left arm, Patient Position: Sitting, BP Method: Medium (Manual))   Pulse 100   Ht 5' 11" (1.803 m)   Wt 56.2 kg (124 lb)   SpO2 96%   BMI 17.29 " "kg/m²     Physical Exam  GENERAL: Older patient in no distress.  HEENT: Pupils equal and reactive. Extraocular movements intact. Nose intact.      Pharynx moist.  NECK: Supple.   HEART: Regular rate and rhythm. No murmur or gallop auscultated.  LUNGS: quiet. Lung excursion symmetrical. No change in fremitus. No adventitial noises.  ABDOMEN: Bowel sounds present. Non-tender, no masses palpated. Urostomy   EXTREMITIES: Normal muscle tone and joint movement, no cyanosis or clubbing.   LYMPHATICS: No adenopathy palpated, no edema.  SKIN: Dry, intact, no lesions.   NEURO: Cranial nerves II-XII intact. Motor strength 5/5 bilaterally, upper and lower extremities.  PSYCH: Appropriate affect.    Alie Shanks NP served in the capacity as a "scribe" for this patient encounter.  A "face to face" encounter occurred with Dr. Laboy on this date  The treatment plan and medical decision making is outlined below:         Assessment:       1. Panlobular emphysema    2. Nocturnal hypoxemia          Plan:       Panlobular emphysema    Nocturnal hypoxemia    Other orders  -     fluticasone-umeclidin-vilanter (TRELEGY ELLIPTA) 100-62.5-25 mcg DsDv; Inhale 1 puff into the lungs once daily.  Dispense: 1 each; Refill: 6      Continue Trelegy daily  Need to wear oxygen at night or whenever he sleeps  Small frequent meals continue with the ensure   Call if you get sick  Refill Trelegy   Flu shot   Follow up in about 6 months (around 2/6/2020).        "

## 2019-08-06 NOTE — PATIENT INSTRUCTIONS
Chronic Lung Disease: Preventing Lung Infections  Chronic lung diseases include chronic obstructive pulmonary disease (COPD), which includes chronic bronchitis and emphysema. Other chronic lung diseases include pulmonary fibrosis, sarcoidosis, and other conditions. When you have chronic lung diseases, it's very important to protect yourself from respiratory infections, like colds, the flu, and lung infections. Infections may cause your lung condition to worsen. Although you can't completely avoid them, there are things you can do to lessen the chance of infections.    Take precautions  Taking the following precautions can help you avoid illness:  · Remember to keep your hands away from your nose and mouth. Germs on your hands get into your respiratory system this way.  · Wash your hands often. When you wash them:  ¨ Use soap and warm water.  ¨ Rub your hands together well for at least 20 seconds.  ¨ Make sure to rinse them well.  ¨ Dry your hands on clean towels or air-dry them.  · Use hand  containing alcohol, if you are unable to wash your hands. Use the  after touching doorknobs, handles, and supermarket carts, for example, since lots of people touch them. Then wash your hands as soon as you can.  · To help prevent the flu, get a flu vaccination every year. This may be given at your healthcare provider's office, a drugstore, or pharmacy, or at work. Get your flu shot as soon as the vaccines are available in your area. This is usually around September each year.  · To help prevent pneumococcal pneumonia, get pneumonia vaccinations. Talk with your healthcare provider about which pneumococcal vaccinations you need.  · Try to stay away from people with respiratory infections, such as colds or the flu. Stay away from crowded places, like shopping centers or movie theatres during cold and flu season.  · If you smoke, think about quitting. In addition to causing or worsening many lung conditions, the  lung damage from smoking increases your risk of infections. Stay away from others who smoke, too. This is also harmful and increases your chance of infections.  Date Last Reviewed: 4/14/2016  © 5727-6647 [employer]. [address] [place] [address], [place] [address]. All rights reserved. This information is not intended as a substitute for professional medical care. Always follow your healthcare professional's instructions.      Continue Trelegy daily  Need to wear oxygen at night or whenever he sleeps  Small frequent meals continue with the ensure   Call if you get sick  Refill Trelegy  Flu shot in October

## 2019-08-26 ENCOUNTER — TELEPHONE (OUTPATIENT)
Dept: FAMILY MEDICINE | Facility: CLINIC | Age: 84
End: 2019-08-26

## 2019-08-26 DIAGNOSIS — R63.6 UNDERWEIGHT: Primary | ICD-10-CM

## 2019-08-26 DIAGNOSIS — J43.1 PANLOBULAR EMPHYSEMA: ICD-10-CM

## 2019-08-26 DIAGNOSIS — F02.80 LATE ONSET ALZHEIMER'S DISEASE WITHOUT BEHAVIORAL DISTURBANCE: ICD-10-CM

## 2019-08-26 DIAGNOSIS — N18.30 CKD (CHRONIC KIDNEY DISEASE) STAGE 3, GFR 30-59 ML/MIN: ICD-10-CM

## 2019-08-26 DIAGNOSIS — G30.1 LATE ONSET ALZHEIMER'S DISEASE WITHOUT BEHAVIORAL DISTURBANCE: ICD-10-CM

## 2019-08-26 DIAGNOSIS — Z93.2 ILEOSTOMY IN PLACE: ICD-10-CM

## 2019-08-26 NOTE — TELEPHONE ENCOUNTER
Lynn Casillas  with [facility]  [phone] called states that Mr Lopez's health has declined and would like to go out and access  him again for hospice care, need referral

## 2019-10-08 ENCOUNTER — TELEPHONE (OUTPATIENT)
Dept: PULMONOLOGY | Facility: CLINIC | Age: 84
End: 2019-10-08

## 2019-10-08 NOTE — TELEPHONE ENCOUNTER
"----- Message from Tash Zhu sent at 10/8/2019  9:54 AM CDT -----  Contact: Anna Knight  Wanted to let you know that he passed away this morning. Said it was "quite unexpected and quite fast".  "

## 2020-07-29 NOTE — PROGRESS NOTES
Subjective:       Patient ID: John Knight is a 84 y.o. male.    Chief Complaint: Establish Care (new patient; est care)    Patient is an 84-year-old gentleman here to establish care with a new PCP. He is accompanied by his wife as he has mild Alzheimer's dementia. He has been seen a neurologist for the past 9 months or so. He also sees a hematologist and although he is not quite sure why he and his wife believe it is secondary to patient shrinking kidney. I see me must have some chronic kidney disease because he also has a nephrologist. The patient has a pulmonologist as well as he has COPD. He did stop smoking about 15 years ago but has an extensive pack year history.    The patient and his wife confirmed that he is very agitated due to the dementia and continually asks the same question over and over again to his wife. He recalls that he is at the doctor so he is oriented to place and person. He does not know the date but he knows he did not have breakfast this morning. He cannot remember what he ate yesterday.    The most notable thing on physical exam as a very high blood pressure 160/100 in both arms taken a few times. He has a family history of hypertension but this is the first time he has ever been told his blood pressure has been up. He denies any headaches or shortness of breath.      Review of Systems   Constitutional: Negative for activity change, diaphoresis, fatigue and fever.   HENT: Negative for congestion, ear pain, postnasal drip, sinus pressure, sore throat and trouble swallowing.    Eyes: Negative for pain.   Respiratory: Negative for cough, chest tightness, shortness of breath and wheezing.    Cardiovascular: Negative for chest pain, palpitations and leg swelling.   Gastrointestinal: Negative for abdominal distention, abdominal pain, blood in stool, constipation and diarrhea.   Endocrine: Negative for cold intolerance and heat intolerance.   Genitourinary: Negative for decreased urine  volume, difficulty urinating, dysuria, flank pain, frequency and hematuria.   Musculoskeletal: Negative for arthralgias, back pain, joint swelling, myalgias and neck pain.   Skin: Negative for pallor, rash and wound.   Neurological: Negative for tremors, syncope, weakness, light-headedness, numbness and headaches.   Hematological: Negative for adenopathy.   Psychiatric/Behavioral: Positive for agitation, confusion, decreased concentration, dysphoric mood and sleep disturbance. Negative for hallucinations, self-injury and suicidal ideas. The patient is nervous/anxious.        Past Medical History:   Diagnosis Date    Arthritis     COPD (chronic obstructive pulmonary disease)     Emphysema of lung     Glaucoma        Past Surgical History:   Procedure Laterality Date    BRAIN SURGERY      COLON SURGERY      COLONOSCOPY      HERNIA REPAIR      TONSILLECTOMY         Family History   Problem Relation Age of Onset    Arthritis Maternal Grandmother        Social History     Social History    Marital status:      Spouse name: N/A    Number of children: N/A    Years of education: N/A     Social History Main Topics    Smoking status: Former Smoker    Smokeless tobacco: Never Used    Alcohol use 0.6 oz/week     1 Cans of beer per week      Comment: occasionally; no longer dringking beer since on Seroquel    Drug use: No    Sexual activity: Not Currently     Other Topics Concern    None     Social History Narrative    None       Current Outpatient Prescriptions   Medication Sig Dispense Refill    citalopram (CELEXA) 10 MG tablet Take 1 tablet (10 mg total) by mouth once daily. 30 tablet 11    FLUZONE HIGH-DOSE 2017-18, PF, 180 mcg/0.5 mL vaccine TO BE ADMINISTERED BY PHARMACIST FOR IMMUNIZATION  0    INCRUSE ELLIPTA 62.5 mcg/actuation DsDv       latanoprost 0.005 % ophthalmic solution       losartan (COZAAR) 25 MG tablet Take 1 tablet (25 mg total) by mouth once daily. 90 tablet 3    quetiapine  (SEROQUEL) 25 MG Tab       SYMBICORT 160-4.5 mcg/actuation HFAA       VENTOLIN HFA 90 mcg/actuation inhaler        No current facility-administered medications for this visit.        Review of patient's allergies indicates:  No Known Allergies  Objective:      Physical Exam   Constitutional: He is oriented to person, place, and time. He appears well-developed and well-nourished. No distress.   HENT:   Head: Normocephalic and atraumatic.   Right Ear: External ear normal.   Left Ear: External ear normal.   Nose: Nose normal.   Mouth/Throat: Oropharynx is clear and moist.   Eyes: Conjunctivae and EOM are normal. Right eye exhibits no discharge. Left eye exhibits no discharge. No scleral icterus.   Neck: Normal range of motion. Neck supple. No JVD present. No tracheal deviation present. No thyromegaly present.   Cardiovascular: Normal rate, regular rhythm, normal heart sounds and intact distal pulses.  Exam reveals no gallop.    No murmur heard.  Pulmonary/Chest: Effort normal. No respiratory distress. He has no wheezes. He has no rales.   Decreased breath sounds bilaterally but no wheezing   Abdominal: Soft. Bowel sounds are normal. He exhibits no distension and no mass. There is no tenderness.   Musculoskeletal: Normal range of motion. He exhibits no edema or tenderness.   Lymphadenopathy:     He has no cervical adenopathy.   Neurological: He is alert and oriented to person, place, and time.   Skin: Skin is warm and dry. No rash noted. He is not diaphoretic. No erythema.   Psychiatric: He has a normal mood and affect. His behavior is normal. Judgment and thought content normal.       Assessment:       1. Panlobular emphysema, quit in 2000, dr mancilla    2. Early onset Alzheimer's dementia without behavioral disturbance, tito    3. Essential hypertension    4. Congenital small kidney    5. Dyslipidemia    6. Fatigue, unspecified type        Plan:       Panlobular emphysema, quit in 2000, dr mancilla    Early onset  Alzheimer's dementia without behavioral disturbance, kreft  -     Methylmalonic acid, serum; Future; Expected date: 10/16/2017  -     TSH; Future; Expected date: 10/16/2017  -     T4, free; Future; Expected date: 10/16/2017  -     Homocysteine, serum; Future; Expected date: 10/16/2017  -     citalopram (CELEXA) 10 MG tablet; Take 1 tablet (10 mg total) by mouth once daily.  Dispense: 30 tablet; Refill: 11    Essential hypertension  -     Comprehensive metabolic panel; Future; Expected date: 10/16/2017  -     losartan (COZAAR) 25 MG tablet; Take 1 tablet (25 mg total) by mouth once daily.  Dispense: 90 tablet; Refill: 3    Congenital small kidney  -     Urinalysis Microscopic; Future; Expected date: 10/16/2017    Dyslipidemia  -     Lipid panel; Future; Expected date: 10/16/2017    Fatigue, unspecified type  -     CBC auto differential; Future; Expected date: 10/16/2017    Other orders  -     Pneumococcal Polysaccharide Vaccine (23 Valent) (SQ/IM)    Time spent with the patient was 60 min and 50 percent of that was in face to face contact   Tick Bite Information, Adult     Ticks are insects that can bite. Most ticks live in shrubs and grassy areas. They climb onto people and animals that go by. Then they bite. Some ticks carry germs that can make you sick.  How can I prevent tick bites?  Use an insect repellent that has 20% or higher of the ingredients DEET, picaridin, or LC3808. Put this insect repellent on:  Bare skin.The tops of your boots.Your pant legs.The ends of your sleeves.If you use an insect repellent that has the ingredient permethrin, make sure to follow the instructions on the bottle. Treat the following:  Clothing.Supplies.Boots.Tents.Wear long sleeves, long pants, and light colors.Tuck your pant legs into your socks.Stay in the middle of the trail.Try not to walk through long grass.Before going inside your house, check your clothes, hair, and skin for ticks. Make sure to check your head, neck, armpits, waist, groin, and joint areas.Check for ticks every day.When you come indoors:  Wash your clothes right away.Shower right away.Dry your clothes in a dryer on high heat for 60 minutes or more.What is the right way to remove a tick?  Remove a tick from your skin as soon as possible.  To remove a tick that is crawling on your skin:  Go outdoors and brush the tick off.Use tape or a lint roller.To remove a tick that is biting:  Wash your hands.If you have latex gloves, put them on.Use tweezers, curved forceps, or a tick-removal tool to grasp the tick. Grasp the tick as close to your skin and as close to the tick's head as possible.Gently pull up until the tick lets go.  Try to keep the tick's head attached to its body.Do not twist or jerk the tick.Do not squeeze or crush the tick.Do not try to remove a tick with heat, alcohol, petroleum jelly, or fingernail polish.  How should I get rid of a tick?  Here are some ways to get rid of a tick that is alive:  Place the tick in rubbing alcohol.Place the tick in a bag or container you can close tightly.Wrap the tick tightly in tape.Flush the tick down the toilet.Contact a doctor if:  You have symptoms of a disease, such as:  Pain in a muscle, joint, or bone.Trouble walking or moving your legs.Numbness in your legs.Inability to move (paralysis).A red rash that makes a [place] (bull's-eye rash).Redness and swelling where the tick bit you.A fever.Throwing up (vomiting) over and over.Diarrhea.Weight loss.Tender and swollen lymph glands.Shortness of breath.Cough.Belly pain (abdominal pain).Headache.Being more tired than normal.A change in how alert (conscious) you are.Confusion.Get help right away if:  You cannot remove a tick.A part of a tick breaks off and gets stuck in your skin.You are feeling worse.Summary  Ticks may carry germs that can make you sick.To prevent tick bites, wear long sleeves, long pants, and light colors. Use insect repellent. Follow the instructions on the bottle.If the tick is biting, do not try to remove it with heat, alcohol, petroleum jelly, or fingernail polish.Use tweezers, curved forceps, or a tick-removal tool to grasp the tick. Gently pull up until the tick lets go. Do not twist or jerk the tick. Do not squeeze or crush the tick.If you have symptoms, contact a doctor.This information is not intended to replace advice given to you by your health care provider. Make sure you discuss any questions you have with your health care provider.

## 2022-09-19 NOTE — TELEPHONE ENCOUNTER
----- Message from Idania Connors MD sent at 1/8/2018  6:13 AM CST -----  Keep ov next week to discuss lab work abnormalities - chol, urine, kidney etc   Hydronephrosis, left